# Patient Record
Sex: FEMALE | Race: OTHER | HISPANIC OR LATINO
[De-identification: names, ages, dates, MRNs, and addresses within clinical notes are randomized per-mention and may not be internally consistent; named-entity substitution may affect disease eponyms.]

---

## 2017-02-06 ENCOUNTER — APPOINTMENT (OUTPATIENT)
Dept: INTERNAL MEDICINE | Facility: CLINIC | Age: 72
End: 2017-02-06

## 2017-02-06 VITALS
BODY MASS INDEX: 26.93 KG/M2 | WEIGHT: 152 LBS | SYSTOLIC BLOOD PRESSURE: 140 MMHG | HEIGHT: 63 IN | OXYGEN SATURATION: 98 % | HEART RATE: 93 BPM | DIASTOLIC BLOOD PRESSURE: 80 MMHG

## 2017-02-06 DIAGNOSIS — M89.9 DISORDER OF BONE, UNSPECIFIED: ICD-10-CM

## 2017-02-08 VITALS — SYSTOLIC BLOOD PRESSURE: 130 MMHG | DIASTOLIC BLOOD PRESSURE: 80 MMHG

## 2017-02-10 LAB
25(OH)D3 SERPL-MCNC: 26.4 NG/ML
ALBUMIN SERPL ELPH-MCNC: 4.3 G/DL
ALP BLD-CCNC: 85 U/L
ALT SERPL-CCNC: 31 U/L
ANION GAP SERPL CALC-SCNC: 17 MMOL/L
AST SERPL-CCNC: 21 U/L
BILIRUB SERPL-MCNC: 0.5 MG/DL
BUN SERPL-MCNC: 16 MG/DL
CALCIUM SERPL-MCNC: 9.9 MG/DL
CHLORIDE SERPL-SCNC: 101 MMOL/L
CHOLEST SERPL-MCNC: 229 MG/DL
CHOLEST/HDLC SERPL: 2.6 RATIO
CO2 SERPL-SCNC: 23 MMOL/L
CREAT SERPL-MCNC: 0.92 MG/DL
GLUCOSE SERPL-MCNC: 103 MG/DL
HBA1C MFR BLD HPLC: 5.6 %
HDLC SERPL-MCNC: 88 MG/DL
LDLC SERPL CALC-MCNC: 120 MG/DL
POTASSIUM SERPL-SCNC: 4.5 MMOL/L
PROT SERPL-MCNC: 7.5 G/DL
SODIUM SERPL-SCNC: 141 MMOL/L
TRIGL SERPL-MCNC: 104 MG/DL
TSH SERPL-ACNC: 0.94 UIU/ML

## 2017-03-01 ENCOUNTER — FORM ENCOUNTER (OUTPATIENT)
Age: 72
End: 2017-03-01

## 2017-03-02 ENCOUNTER — APPOINTMENT (OUTPATIENT)
Dept: CT IMAGING | Facility: IMAGING CENTER | Age: 72
End: 2017-03-02

## 2017-03-02 ENCOUNTER — OUTPATIENT (OUTPATIENT)
Dept: OUTPATIENT SERVICES | Facility: HOSPITAL | Age: 72
LOS: 1 days | End: 2017-03-02
Payer: MEDICARE

## 2017-03-02 DIAGNOSIS — Q75.9 CONGENITAL MALFORMATION OF SKULL AND FACE BONES, UNSPECIFIED: ICD-10-CM

## 2017-03-02 DIAGNOSIS — M89.9 DISORDER OF BONE, UNSPECIFIED: ICD-10-CM

## 2017-03-02 PROCEDURE — 70450 CT HEAD/BRAIN W/O DYE: CPT

## 2017-03-28 ENCOUNTER — APPOINTMENT (OUTPATIENT)
Dept: ORTHOPEDIC SURGERY | Facility: CLINIC | Age: 72
End: 2017-03-28

## 2017-08-09 ENCOUNTER — APPOINTMENT (OUTPATIENT)
Dept: INTERNAL MEDICINE | Facility: CLINIC | Age: 72
End: 2017-08-09
Payer: MEDICARE

## 2017-08-09 VITALS
DIASTOLIC BLOOD PRESSURE: 84 MMHG | WEIGHT: 153 LBS | HEIGHT: 63 IN | SYSTOLIC BLOOD PRESSURE: 154 MMHG | BODY MASS INDEX: 27.11 KG/M2

## 2017-08-09 LAB
BILIRUB UR QL STRIP: NORMAL
CLARITY UR: CLEAR
COLLECTION METHOD: NORMAL
GLUCOSE UR-MCNC: NORMAL
HCG UR QL: 0.2 EU/DL
HGB UR QL STRIP.AUTO: NORMAL
KETONES UR-MCNC: NORMAL
LEUKOCYTE ESTERASE UR QL STRIP: NORMAL
NITRITE UR QL STRIP: NORMAL
PH UR STRIP: 5.5
PROT UR STRIP-MCNC: NORMAL
SP GR UR STRIP: 1.02

## 2017-08-09 PROCEDURE — 99213 OFFICE O/P EST LOW 20 MIN: CPT | Mod: 25

## 2017-08-09 PROCEDURE — 81003 URINALYSIS AUTO W/O SCOPE: CPT | Mod: QW

## 2017-08-10 ENCOUNTER — TRANSCRIPTION ENCOUNTER (OUTPATIENT)
Age: 72
End: 2017-08-10

## 2017-08-11 ENCOUNTER — TRANSCRIPTION ENCOUNTER (OUTPATIENT)
Age: 72
End: 2017-08-11

## 2017-08-14 ENCOUNTER — APPOINTMENT (OUTPATIENT)
Dept: ULTRASOUND IMAGING | Facility: CLINIC | Age: 72
End: 2017-08-14

## 2017-08-14 ENCOUNTER — TRANSCRIPTION ENCOUNTER (OUTPATIENT)
Age: 72
End: 2017-08-14

## 2017-08-14 LAB
APPEARANCE: ABNORMAL
BACTERIA UR CULT: NORMAL
BACTERIA: NEGATIVE
BILIRUBIN URINE: NEGATIVE
BLOOD URINE: ABNORMAL
COLOR: YELLOW
GLUCOSE QUALITATIVE U: NORMAL MG/DL
HYALINE CASTS: 2 /LPF
KETONES URINE: NEGATIVE
LEUKOCYTE ESTERASE URINE: ABNORMAL
MICROSCOPIC-UA: NORMAL
NITRITE URINE: NEGATIVE
PH URINE: 5.5
PROTEIN URINE: ABNORMAL MG/DL
RED BLOOD CELLS URINE: 157 /HPF
SPECIFIC GRAVITY URINE: 1.02
SQUAMOUS EPITHELIAL CELLS: 4 /HPF
UROBILINOGEN URINE: NORMAL MG/DL
WHITE BLOOD CELLS URINE: 4 /HPF

## 2017-08-30 ENCOUNTER — TRANSCRIPTION ENCOUNTER (OUTPATIENT)
Age: 72
End: 2017-08-30

## 2017-12-11 ENCOUNTER — TRANSCRIPTION ENCOUNTER (OUTPATIENT)
Age: 72
End: 2017-12-11

## 2017-12-13 ENCOUNTER — APPOINTMENT (OUTPATIENT)
Dept: ENDOCRINOLOGY | Facility: CLINIC | Age: 72
End: 2017-12-13
Payer: MEDICARE

## 2017-12-13 VITALS
BODY MASS INDEX: 3.19 KG/M2 | WEIGHT: 18 LBS | HEART RATE: 83 BPM | DIASTOLIC BLOOD PRESSURE: 72 MMHG | OXYGEN SATURATION: 98 % | HEIGHT: 63 IN | SYSTOLIC BLOOD PRESSURE: 124 MMHG

## 2017-12-13 PROCEDURE — 77080 DXA BONE DENSITY AXIAL: CPT | Mod: GA

## 2017-12-13 PROCEDURE — 99214 OFFICE O/P EST MOD 30 MIN: CPT | Mod: 25

## 2017-12-13 RX ORDER — MULTIVIT-MIN/FOLIC/VIT K/LYCOP 400-300MCG
25 MCG TABLET ORAL
Refills: 0 | Status: ACTIVE | COMMUNITY

## 2017-12-13 RX ORDER — DOXEPIN HYDROCHLORIDE 10 MG/1
10 CAPSULE ORAL
Qty: 30 | Refills: 0 | Status: DISCONTINUED | COMMUNITY
Start: 2017-03-20 | End: 2017-12-13

## 2018-02-12 ENCOUNTER — APPOINTMENT (OUTPATIENT)
Dept: INTERNAL MEDICINE | Facility: CLINIC | Age: 73
End: 2018-02-12
Payer: MEDICARE

## 2018-02-12 VITALS
DIASTOLIC BLOOD PRESSURE: 70 MMHG | BODY MASS INDEX: 27.46 KG/M2 | HEART RATE: 68 BPM | SYSTOLIC BLOOD PRESSURE: 130 MMHG | OXYGEN SATURATION: 97 % | WEIGHT: 155 LBS | HEIGHT: 63 IN

## 2018-02-12 DIAGNOSIS — R06.09 OTHER FORMS OF DYSPNEA: ICD-10-CM

## 2018-02-12 DIAGNOSIS — R31.9 HEMATURIA, UNSPECIFIED: ICD-10-CM

## 2018-02-12 PROCEDURE — 99215 OFFICE O/P EST HI 40 MIN: CPT

## 2018-02-21 LAB
25(OH)D3 SERPL-MCNC: 26.4 NG/ML
ALBUMIN SERPL ELPH-MCNC: 4.2 G/DL
ALP BLD-CCNC: 78 U/L
ALT SERPL-CCNC: 31 U/L
ANION GAP SERPL CALC-SCNC: 14 MMOL/L
AST SERPL-CCNC: 33 U/L
BASOPHILS # BLD AUTO: 0.02 K/UL
BASOPHILS NFR BLD AUTO: 0.4 %
BILIRUB SERPL-MCNC: 0.5 MG/DL
BUN SERPL-MCNC: 18 MG/DL
CALCIUM SERPL-MCNC: 9.9 MG/DL
CHLORIDE SERPL-SCNC: 101 MMOL/L
CHOLEST SERPL-MCNC: 238 MG/DL
CHOLEST/HDLC SERPL: 2.6 RATIO
CO2 SERPL-SCNC: 23 MMOL/L
CREAT SERPL-MCNC: 0.82 MG/DL
EOSINOPHIL # BLD AUTO: 0.05 K/UL
EOSINOPHIL NFR BLD AUTO: 1.1 %
GLUCOSE SERPL-MCNC: 103 MG/DL
HBA1C MFR BLD HPLC: 5.5 %
HCT VFR BLD CALC: 42.5 %
HDLC SERPL-MCNC: 92 MG/DL
HGB BLD-MCNC: 14.2 G/DL
IMM GRANULOCYTES NFR BLD AUTO: 0.4 %
LDLC SERPL CALC-MCNC: 126 MG/DL
LYMPHOCYTES # BLD AUTO: 1.02 K/UL
LYMPHOCYTES NFR BLD AUTO: 22 %
MAN DIFF?: NORMAL
MCHC RBC-ENTMCNC: 31.3 PG
MCHC RBC-ENTMCNC: 33.4 GM/DL
MCV RBC AUTO: 93.8 FL
MONOCYTES # BLD AUTO: 0.39 K/UL
MONOCYTES NFR BLD AUTO: 8.4 %
NEUTROPHILS # BLD AUTO: 3.13 K/UL
NEUTROPHILS NFR BLD AUTO: 67.7 %
PLATELET # BLD AUTO: 137 K/UL
POTASSIUM SERPL-SCNC: 5 MMOL/L
PROT SERPL-MCNC: 7.3 G/DL
RBC # BLD: 4.53 M/UL
RBC # FLD: 13.3 %
SODIUM SERPL-SCNC: 138 MMOL/L
TRIGL SERPL-MCNC: 100 MG/DL
TSH SERPL-ACNC: 0.64 UIU/ML
WBC # FLD AUTO: 4.63 K/UL

## 2018-03-29 ENCOUNTER — LABORATORY RESULT (OUTPATIENT)
Age: 73
End: 2018-03-29

## 2018-04-30 ENCOUNTER — APPOINTMENT (OUTPATIENT)
Dept: INTERNAL MEDICINE | Facility: CLINIC | Age: 73
End: 2018-04-30
Payer: MEDICARE

## 2018-04-30 VITALS
HEIGHT: 63 IN | HEART RATE: 82 BPM | DIASTOLIC BLOOD PRESSURE: 80 MMHG | BODY MASS INDEX: 27.82 KG/M2 | OXYGEN SATURATION: 97 % | SYSTOLIC BLOOD PRESSURE: 130 MMHG | WEIGHT: 157 LBS

## 2018-04-30 PROCEDURE — 99214 OFFICE O/P EST MOD 30 MIN: CPT

## 2018-06-14 ENCOUNTER — OUTPATIENT (OUTPATIENT)
Dept: OUTPATIENT SERVICES | Facility: HOSPITAL | Age: 73
LOS: 1 days | Discharge: HOME | End: 2018-06-14

## 2018-06-14 DIAGNOSIS — D69.6 THROMBOCYTOPENIA, UNSPECIFIED: ICD-10-CM

## 2018-06-15 ENCOUNTER — RESULT REVIEW (OUTPATIENT)
Age: 73
End: 2018-06-15

## 2018-06-15 ENCOUNTER — APPOINTMENT (OUTPATIENT)
Dept: HEMATOLOGY ONCOLOGY | Facility: CLINIC | Age: 73
End: 2018-06-15
Payer: MEDICARE

## 2018-06-15 DIAGNOSIS — Z83.2 FAMILY HISTORY OF DISEASES OF THE BLOOD AND BLOOD-FORMING ORGANS AND CERTAIN DISORDERS INVOLVING THE IMMUNE MECHANISM: ICD-10-CM

## 2018-06-15 LAB
BASOPHILS # BLD AUTO: 0 K/UL — SIGNIFICANT CHANGE UP (ref 0–0.2)
BASOPHILS NFR BLD AUTO: 0.4 % — SIGNIFICANT CHANGE UP (ref 0–2)
EOSINOPHIL # BLD AUTO: 0.2 K/UL — SIGNIFICANT CHANGE UP (ref 0–0.5)
EOSINOPHIL NFR BLD AUTO: 2 % — SIGNIFICANT CHANGE UP (ref 0–6)
HCT VFR BLD CALC: 40.6 % — SIGNIFICANT CHANGE UP (ref 34.5–45)
HGB BLD-MCNC: 14 G/DL — SIGNIFICANT CHANGE UP (ref 11.5–15.5)
LYMPHOCYTES # BLD AUTO: 1.7 K/UL — SIGNIFICANT CHANGE UP (ref 1–3.3)
LYMPHOCYTES # BLD AUTO: 18.6 % — SIGNIFICANT CHANGE UP (ref 13–44)
MCHC RBC-ENTMCNC: 31.3 PG — SIGNIFICANT CHANGE UP (ref 27–34)
MCHC RBC-ENTMCNC: 34.4 G/DL — SIGNIFICANT CHANGE UP (ref 32–36)
MCV RBC AUTO: 91.1 FL — SIGNIFICANT CHANGE UP (ref 80–100)
MONOCYTES # BLD AUTO: 0.6 K/UL — SIGNIFICANT CHANGE UP (ref 0–0.9)
MONOCYTES NFR BLD AUTO: 7 % — SIGNIFICANT CHANGE UP (ref 2–14)
NEUTROPHILS # BLD AUTO: 6.6 K/UL — SIGNIFICANT CHANGE UP (ref 1.8–7.4)
NEUTROPHILS NFR BLD AUTO: 72.1 % — SIGNIFICANT CHANGE UP (ref 43–77)
PLATELET # BLD AUTO: 177 K/UL — SIGNIFICANT CHANGE UP (ref 150–400)
RBC # BLD: 4.46 M/UL — SIGNIFICANT CHANGE UP (ref 3.8–5.2)
RBC # FLD: 11.5 % — SIGNIFICANT CHANGE UP (ref 10.3–14.5)
WBC # BLD: 9.2 K/UL — SIGNIFICANT CHANGE UP (ref 3.8–10.5)
WBC # FLD AUTO: 9.2 K/UL — SIGNIFICANT CHANGE UP (ref 3.8–10.5)

## 2018-06-15 PROCEDURE — 99202 OFFICE O/P NEW SF 15 MIN: CPT

## 2018-07-02 ENCOUNTER — APPOINTMENT (OUTPATIENT)
Dept: OBGYN | Facility: CLINIC | Age: 73
End: 2018-07-02
Payer: MEDICARE

## 2018-07-02 VITALS
DIASTOLIC BLOOD PRESSURE: 80 MMHG | BODY MASS INDEX: 27.29 KG/M2 | SYSTOLIC BLOOD PRESSURE: 138 MMHG | WEIGHT: 154 LBS | HEIGHT: 63 IN

## 2018-07-02 DIAGNOSIS — R10.9 UNSPECIFIED ABDOMINAL PAIN: ICD-10-CM

## 2018-07-02 PROCEDURE — G0101: CPT

## 2018-07-02 RX ORDER — KETOCONAZOLE 20.5 MG/ML
2 SHAMPOO, SUSPENSION TOPICAL
Qty: 120 | Refills: 0 | Status: COMPLETED | COMMUNITY
Start: 2017-03-28 | End: 2018-07-02

## 2018-07-04 LAB — HPV HIGH+LOW RISK DNA PNL CVX: NOT DETECTED

## 2018-07-06 LAB — CYTOLOGY CVX/VAG DOC THIN PREP: NORMAL

## 2018-12-20 PROBLEM — Z83.2 FAMILY HISTORY OF THROMBOCYTOPENIA: Status: ACTIVE | Noted: 2018-12-20

## 2018-12-20 RX ORDER — CARBOXYMETHYLCELLULOSE SODIUM 5 MG/ML
0.5 SOLUTION/ DROPS OPHTHALMIC
Refills: 0 | Status: ACTIVE | COMMUNITY
Start: 2018-12-20

## 2018-12-20 NOTE — CONSULT LETTER
[Dear  ___] : Dear ~PADMINI, [Courtesy Letter:] : I had the pleasure of seeing your patient, [unfilled], in my office today. [Please see my note below.] : Please see my note below. [Consult Closing:] : Thank you very much for allowing me to participate in the care of this patient.  If you have any questions, please do not hesitate to contact me. [Sincerely,] : Sincerely, [FreeTextEntry2] : Ingrid Lopez MD [FreeTextEntry3] : Avinash\par Cristobal Mccabe M.D., FACP\par Professor of Medicine\par [employer]\par Associate Chief, Division of Hematology\par [facility]\par [facility]\par [address]\[place] [address]\par [phone]\par \par \par \par

## 2018-12-20 NOTE — REVIEW OF SYSTEMS
[SOB on Exertion] : shortness of breath during exertion [Negative] : Allergic/Immunologic [Lower Ext Edema] : lower extremity edema [Abdominal Pain] : abdominal pain

## 2018-12-20 NOTE — HISTORY OF PRESENT ILLNESS
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 In February, 2018 her platelet count was noted to be 137,000. Repeat in March, 2018 was 135,000\par She feels well and is free of c/o. She notes minor bruising. She has occasional left upper quadrant fleeting sharp pains. No bleeding, HAs, visual problems, rash, arthritis. \par \par Her mother had myelodysplastic syndrome. Her son reportedly has thrombocytopenia..

## 2018-12-20 NOTE — ADDENDUM
[FreeTextEntry1] : Documented by Clint Sy acting as a scribe for Dr. Cristobal Mccabe on 12/20/18\par \par All medical record entries made by the Scribe were at my, Dr. Cristobal Mccabe's, direction and personally dictated by me on 12/20/18. I have reviewed the chart and agree that the record accurately reflects my personal performance of the history, physical exam, procedure and imaging

## 2018-12-20 NOTE — ASSESSMENT
[FreeTextEntry1] : 71 YO F with borderline thrombocytopenia several months ago. Her platelet count is now normal. Alcohol ingestion can lower platelet counts by direct suppression of the marrow.\par \par Plan:\par Reassure patient\par Monitor CBC with platelets. I will be pleased to review these results as needed. [Palliative Care Plan] : not applicable at this time

## 2019-02-05 ENCOUNTER — APPOINTMENT (OUTPATIENT)
Dept: ENDOCRINOLOGY | Facility: CLINIC | Age: 74
End: 2019-02-05
Payer: MEDICARE

## 2019-02-05 ENCOUNTER — APPOINTMENT (OUTPATIENT)
Dept: ENDOCRINOLOGY | Facility: CLINIC | Age: 74
End: 2019-02-05
Payer: COMMERCIAL

## 2019-02-05 VITALS
BODY MASS INDEX: 27.64 KG/M2 | SYSTOLIC BLOOD PRESSURE: 126 MMHG | HEIGHT: 63 IN | WEIGHT: 156 LBS | HEART RATE: 96 BPM | DIASTOLIC BLOOD PRESSURE: 80 MMHG | OXYGEN SATURATION: 98 %

## 2019-02-05 PROCEDURE — 77080 DXA BONE DENSITY AXIAL: CPT

## 2019-02-05 PROCEDURE — ZZZZZ: CPT

## 2019-02-05 PROCEDURE — 99214 OFFICE O/P EST MOD 30 MIN: CPT | Mod: 25

## 2019-02-05 RX ORDER — ACETAZOLAMIDE 125 MG/1
125 TABLET ORAL
Qty: 30 | Refills: 0 | Status: DISCONTINUED | COMMUNITY
Start: 2018-12-14 | End: 2019-02-05

## 2019-02-05 NOTE — PHYSICAL EXAM
[Alert] : alert [No Acute Distress] : no acute distress [Well Nourished] : well nourished [Well Developed] : well developed [Normal Sclera/Conjunctiva] : normal sclera/conjunctiva [No Proptosis] : no proptosis [Normal Oropharynx] : the oropharynx was normal [Thyroid Not Enlarged] : the thyroid was not enlarged [No Thyroid Nodules] : there were no palpable thyroid nodules [No Respiratory Distress] : no respiratory distress [No Accessory Muscle Use] : no accessory muscle use [Clear to Auscultation] : lungs were clear to auscultation bilaterally [Normal Rate] : heart rate was normal  [Normal S1, S2] : normal S1 and S2 [Regular Rhythm] : with a regular rhythm [Normal Bowel Sounds] : normal bowel sounds [Not Tender] : non-tender [Soft] : abdomen soft [Not Distended] : not distended [Post Cervical Nodes] : posterior cervical nodes [Anterior Cervical Nodes] : anterior cervical nodes [Axillary Nodes] : axillary nodes [Normal] : normal and non tender [No Spinal Tenderness] : no spinal tenderness [Spine Straight] : spine straight [No Stigmata of Cushings Syndrome] : no stigmata of cushings syndrome [Normal Gait] : normal gait [Normal Strength/Tone] : muscle strength and tone were normal [No Rash] : no rash [Normal Reflexes] : deep tendon reflexes were 2+ and symmetric [No Tremors] : no tremors [Oriented x3] : oriented to person, place, and time

## 2019-02-06 ENCOUNTER — RX RENEWAL (OUTPATIENT)
Age: 74
End: 2019-02-06

## 2019-02-06 NOTE — HISTORY OF PRESENT ILLNESS
[Risedronate (Actonel)] : Risedronate [Vitamin D (supplements)] : Vitamin D as a dietary supplement [Patient taking Meds Correctly] : Patient is taking meds correctly [FreeTextEntry1] : 1 year f/u 72 y/o female for osteoporosis. \par \par Pt previously on Actonel followed by drug holiday. She was then back on rx, off Actonel  since ~2013. Took Actonel for many years, tolerated well. BMD 12/2016 stable, sl increase in hip. BMD 2017 sl decrease in hip. Last DDS 3 mons. Prior dental implants. No ONJ. No interval fx. \par \par Had hematuria, dx with small stones saw urology Dr. Miranda.

## 2019-02-06 NOTE — PROCEDURE
[FreeTextEntry1] : Bone mineral density: 02/05/2019 \par Indication: vs. 2017 prior test showed bone loss \par Spine: -2.0 osteopenia, no significant change \par Total hip: -2.3 osteopenia (-4.3%)\par Femoral neck: -3.3 osteoporosis (-5.6%)\par Proximal radius: -3.8 osteoporosis (-6.8%)\par \par Bone mineral density December 13, 2017\par Indication prior test showed rapid bone loss\par Spine -2.0, osteopenia, no significant change\par Total hip -2.0, osteopenia, -4.9% versus 2016 although stable versus 2014\par Femoral neck -3.0, osteoporosis, -3.8%, not significant\par Proximal radius -3.3, osteoporosis, no significant change\par \par Bone mineral density December 13, 2016\par Two-year followup\par Spine -1.8, osteopenia, no significant change\par Total hip -1.7, osteopenia, +6.0%\par Femoral neck -2.8, osteoporosis, no significant change\par Troch -1.4, osteopenia, +4.3%\par Proximal radius -3.2, osteoporosis, no significant change\par \par bone mineral density Nov 26, 2014\par spine -1.8, osteopenia, stable vs 2012\par total hip, -2.1 osteopenia, stable\par femoral neck, -2.8, osteoporosis, stable\par proximal radius -3.1 ,osteoporosis,  -3.8 % vs 2012

## 2019-02-06 NOTE — END OF VISIT
[FreeTextEntry3] : I, Radha Garber, authored this note working as a medical scribe for Dr. Roman.  02/05/2019.  3:00PM. \par This note was authored by Radha Garber working as medical scribe for me. I have reviewed, edited, and revised the note as needed. I am in agreement with the exam findings, imaging findings, and treatment plan.  Braxton Roman MD

## 2019-02-06 NOTE — ASSESSMENT
[Bisphosphonate Therapy] : Risks  and benefits of bisphosphonate therapy were  discussed with the patient including gastroesophageal irritation, osteonecrosis of the jaw, and atypical femur fractures, and acute phase reaction [Bisphosphonates] : The patient was instructed to take bisphosphonates on an empty stomach with a full glass of water,and wait at least 30 minutes before eating or lying down [FreeTextEntry1] : 73 year-old female with osteoporosis. \par \par Pt is on a drug holiday after long term  Actonel started ~2013. BMD essentially stable; 2014 sl decrease in proximal radius, 2016 increased in tot hip, 2017 sl decrease in tot hip and fem neck. Option of restarting therapy or continuing drug holiday discussed. Elected to continue drug holiday. No interval fx. c/o arthritic pain. Repeat BMD 2/2019 indicates continued decrease in the tot hip, fem neck, and proximal radius. \par \par I recommend pt restart osteoporosis rx as she is at increased risk for future fx. Pt can restart Actonel as she previously took this and tolerated this. Risks and benefits discussed. All questions were answered. Pt understands and agrees. Prescription sent for Actonel. \par \par f/u in 1 year with repeat BMD. \par

## 2019-02-19 ENCOUNTER — APPOINTMENT (OUTPATIENT)
Dept: INTERNAL MEDICINE | Facility: CLINIC | Age: 74
End: 2019-02-19

## 2019-03-18 ENCOUNTER — TRANSCRIPTION ENCOUNTER (OUTPATIENT)
Age: 74
End: 2019-03-18

## 2019-03-19 ENCOUNTER — APPOINTMENT (OUTPATIENT)
Dept: INTERNAL MEDICINE | Facility: CLINIC | Age: 74
End: 2019-03-19
Payer: MEDICARE

## 2019-03-19 VITALS
OXYGEN SATURATION: 97 % | BODY MASS INDEX: 27.64 KG/M2 | TEMPERATURE: 98.5 F | WEIGHT: 156 LBS | SYSTOLIC BLOOD PRESSURE: 140 MMHG | DIASTOLIC BLOOD PRESSURE: 88 MMHG | HEART RATE: 96 BPM | HEIGHT: 63 IN

## 2019-03-19 DIAGNOSIS — M25.552 PAIN IN LEFT HIP: ICD-10-CM

## 2019-03-19 DIAGNOSIS — R09.81 NASAL CONGESTION: ICD-10-CM

## 2019-03-19 DIAGNOSIS — K58.9 IRRITABLE BOWEL SYNDROME W/OUT DIARRHEA: ICD-10-CM

## 2019-03-19 PROCEDURE — G0442 ANNUAL ALCOHOL SCREEN 15 MIN: CPT | Mod: 59

## 2019-03-19 PROCEDURE — G0439: CPT

## 2019-03-19 PROCEDURE — G0444 DEPRESSION SCREEN ANNUAL: CPT | Mod: 59

## 2019-03-19 NOTE — HISTORY OF PRESENT ILLNESS
[FreeTextEntry1] : cpe [de-identified] : 74 yo woman w OP, Breast ca. (DCIS ), allergic rhinitis/ chronic conjunctivitis , renal stones, depression/anxiety.\par Saw Endo Dr. KOHLI re OP, back on Actonel. BMD 2/19\par Gyn 7/18\par Heme re low plts, improved (etoh was suspected culprit)\par Mammo/US at [facility] [place] [place] 10/18\par [place] 9/17\par [place] regularly, going next mo\par \par c/o feeling down about her personal problems, family etc- didn't elaborate- not suicidal- does not like meds and does not like the idea or counseling or therapy. She would like to try "something light" medication-wise at this time. \par c/o allergic rhinitis activin up, taking Zyrtec-D, eye drops etc- feels she needs eye abx as she had discharge and itching, showed me last script from optho  Jordi-Poly-Dex.\par c/o left hip pain, chr, possible OA\par \par Still drinks 1-3 glasses of wine/d, not amenable to cutting down.\par Not exercising.\par doing a lot of traveling.

## 2019-03-19 NOTE — HEALTH RISK ASSESSMENT
[Good] : ~his/her~  mood as  good [No falls in past year] : Patient reported no falls in the past year [0] : 2) Feeling down, depressed, or hopeless: Not at all (0) [Patient reported mammogram was normal] : Patient reported mammogram was normal [Patient reported PAP Smear was normal] : Patient reported PAP Smear was normal [Patient reported colonoscopy was normal] : Patient reported colonoscopy was normal [Patient reported bone density results were abnormal] : Patient reported bone density results were abnormal [HIV test declined] : HIV test declined [Hepatitis C test declined] : Hepatitis C test declined [Alone] : lives alone [Retired] : retired [Single] : single [Feels Safe at Home] : Feels safe at home [Fully functional (bathing, dressing, toileting, transferring, walking, feeding)] : Fully functional (bathing, dressing, toileting, transferring, walking, feeding) [Fully functional (using the telephone, shopping, preparing meals, housekeeping, doing laundry, using] : Fully functional and needs no help or supervision to perform IADLs (using the telephone, shopping, preparing meals, housekeeping, doing laundry, using transportation, managing medications and managing finances) [Smoke Detector] : smoke detector [Carbon Monoxide Detector] : carbon monoxide detector [Safety elements used in home] : safety elements used in home [Seat Belt] :  uses seat belt [Designated Healthcare Proxy] : Designated healthcare proxy [None] : None [de-identified] : no [] : No [de-identified] : ok [Change in mental status noted] : No change in mental status noted [Language] : denies difficulty with language [# Of Children ___] : has [unfilled] children [Sexually Active] : not sexually active [Reports changes in hearing] : Reports no changes in hearing [Reports changes in vision] : Reports no changes in vision [Reports changes in dental health] : Reports no changes in dental health [Guns at Home] : no guns at home [Travel to Developing Areas] : does not  travel to developing areas [MammogramDate] : 10/18 [PapSmearDate] : 07/18 [BoneDensityDate] : 02/19 [ColonoscopyDate] : 09/17 [FreeTextEntry4] : will name one of her kids- son or  dgtr-\par not sure what she wants or which kid

## 2019-03-19 NOTE — COUNSELING
[Activity counseling provided] : activity [Fall prevention counseling provided] : fall prevention  [Good understanding] : Patient has a good understanding of lifestyle changes and the steps needed to achieve self management goals [ - Annual Alcohol Misuse Screening] : Annual Alcohol Misuse Screening [ - Annual Depression Screening] : Annual Depression Screening

## 2019-03-19 NOTE — ASSESSMENT
[FreeTextEntry1] : Pt w above conditions-\par we discussed cutting down etoh, which may predispose her to falls..\par BP sl elev, d/c meds w sudafed in them and monitor; decrease etoh too.\par Depression counseling, was amenable to a low dose of Sertraline and let me know how she's doing in 1-2 mos. not amenable to any counseling.\par send in Atrovent nasal spray\par check routine labs\par Ortho for hip\par Requests Vasc for VV\par Optho next mo; send in Poly shital dex\par \par

## 2019-03-19 NOTE — PHYSICAL EXAM
[No Acute Distress] : no acute distress [Well Nourished] : well nourished [Well Developed] : well developed [Well-Appearing] : well-appearing [PERRL] : pupils equal round and reactive to light [EOMI] : extraocular movements intact [Normal Outer Ear/Nose] : the outer ears and nose were normal in appearance [Normal Oropharynx] : the oropharynx was normal [No JVD] : no jugular venous distention [Supple] : supple [No Lymphadenopathy] : no lymphadenopathy [Thyroid Normal, No Nodules] : the thyroid was normal and there were no nodules present [No Respiratory Distress] : no respiratory distress  [Clear to Auscultation] : lungs were clear to auscultation bilaterally [No Accessory Muscle Use] : no accessory muscle use [Normal Rate] : normal rate  [Regular Rhythm] : with a regular rhythm [Normal S1, S2] : normal S1 and S2 [No Murmur] : no murmur heard [No Carotid Bruits] : no carotid bruits [No Abdominal Bruit] : a ~M bruit was not heard ~T in the abdomen [No Varicosities] : no varicosities [Pedal Pulses Present] : the pedal pulses are present [No Edema] : there was no peripheral edema [No Extremity Clubbing/Cyanosis] : no extremity clubbing/cyanosis [No Palpable Aorta] : no palpable aorta [Normal Appearance] : normal in appearance [No Axillary Lymphadenopathy] : no axillary lymphadenopathy [Soft] : abdomen soft [Non Tender] : non-tender [Non-distended] : non-distended [No Masses] : no abdominal mass palpated [No HSM] : no HSM [Normal Bowel Sounds] : normal bowel sounds [Normal Posterior Cervical Nodes] : no posterior cervical lymphadenopathy [Normal Anterior Cervical Nodes] : no anterior cervical lymphadenopathy [No CVA Tenderness] : no CVA  tenderness [No Spinal Tenderness] : no spinal tenderness [No Joint Swelling] : no joint swelling [Grossly Normal Strength/Tone] : grossly normal strength/tone [No Rash] : no rash [Normal Gait] : normal gait [Coordination Grossly Intact] : coordination grossly intact [No Focal Deficits] : no focal deficits [Deep Tendon Reflexes (DTR)] : deep tendon reflexes were 2+ and symmetric [Normal Affect] : the affect was normal [Normal Insight/Judgement] : insight and judgment were intact [de-identified] : bilat conjunctiva injected [de-identified] : VV

## 2019-03-26 LAB
ALBUMIN SERPL ELPH-MCNC: 4.4 G/DL
ALP BLD-CCNC: 87 U/L
ALT SERPL-CCNC: 28 U/L
ANION GAP SERPL CALC-SCNC: 12 MMOL/L
AST SERPL-CCNC: 26 U/L
BASOPHILS # BLD AUTO: 0.03 K/UL
BASOPHILS NFR BLD AUTO: 0.4 %
BILIRUB SERPL-MCNC: 0.4 MG/DL
BUN SERPL-MCNC: 13 MG/DL
CALCIUM SERPL-MCNC: 9.9 MG/DL
CHLORIDE SERPL-SCNC: 100 MMOL/L
CHOLEST SERPL-MCNC: 214 MG/DL
CHOLEST/HDLC SERPL: 2.4 RATIO
CO2 SERPL-SCNC: 26 MMOL/L
CREAT SERPL-MCNC: 0.8 MG/DL
EOSINOPHIL # BLD AUTO: 0.15 K/UL
EOSINOPHIL NFR BLD AUTO: 2.1 %
GLUCOSE SERPL-MCNC: 105 MG/DL
HBA1C MFR BLD HPLC: 5.2 %
HCT VFR BLD CALC: 45.1 %
HDLC SERPL-MCNC: 90 MG/DL
HGB BLD-MCNC: 14.5 G/DL
IMM GRANULOCYTES NFR BLD AUTO: 0.8 %
LDLC SERPL CALC-MCNC: 105 MG/DL
LYMPHOCYTES # BLD AUTO: 0.86 K/UL
LYMPHOCYTES NFR BLD AUTO: 12.1 %
MAN DIFF?: NORMAL
MCHC RBC-ENTMCNC: 30.5 PG
MCHC RBC-ENTMCNC: 32.2 GM/DL
MCV RBC AUTO: 94.9 FL
MONOCYTES # BLD AUTO: 0.77 K/UL
MONOCYTES NFR BLD AUTO: 10.9 %
NEUTROPHILS # BLD AUTO: 5.21 K/UL
NEUTROPHILS NFR BLD AUTO: 73.7 %
PLATELET # BLD AUTO: 156 K/UL
POTASSIUM SERPL-SCNC: 4.8 MMOL/L
PROT SERPL-MCNC: 6.9 G/DL
RBC # BLD: 4.75 M/UL
RBC # FLD: 12.8 %
SODIUM SERPL-SCNC: 138 MMOL/L
TRIGL SERPL-MCNC: 96 MG/DL
TSH SERPL-ACNC: 0.85 UIU/ML
WBC # FLD AUTO: 7.08 K/UL

## 2020-02-14 ENCOUNTER — APPOINTMENT (OUTPATIENT)
Dept: ENDOCRINOLOGY | Facility: CLINIC | Age: 75
End: 2020-02-14
Payer: MEDICARE

## 2020-02-14 VITALS
WEIGHT: 154 LBS | OXYGEN SATURATION: 98 % | HEIGHT: 62.9 IN | HEART RATE: 85 BPM | BODY MASS INDEX: 27.29 KG/M2 | DIASTOLIC BLOOD PRESSURE: 68 MMHG | SYSTOLIC BLOOD PRESSURE: 110 MMHG

## 2020-02-14 PROCEDURE — 99213 OFFICE O/P EST LOW 20 MIN: CPT | Mod: 25

## 2020-02-14 PROCEDURE — ZZZZZ: CPT

## 2020-02-14 PROCEDURE — 77080 DXA BONE DENSITY AXIAL: CPT | Mod: GA

## 2020-02-14 RX ORDER — IPRATROPIUM BROMIDE AND ALBUTEROL 20; 100 UG/1; UG/1
20-100 SPRAY, METERED RESPIRATORY (INHALATION) 4 TIMES DAILY
Qty: 1 | Refills: 3 | Status: DISCONTINUED | COMMUNITY
Start: 2018-02-12 | End: 2020-02-14

## 2020-02-14 RX ORDER — NEOMYCIN SULFATE, POLYMYXIN B SULFATE AND DEXAMETHASONE 3.5; 10000; 1 MG/ML; [USP'U]/ML; MG/ML
3.5-10000-0.1 SUSPENSION OPHTHALMIC 4 TIMES DAILY
Qty: 1 | Refills: 5 | Status: DISCONTINUED | COMMUNITY
Start: 2019-03-19 | End: 2020-02-14

## 2020-02-14 RX ORDER — SERTRALINE 25 MG/1
25 TABLET, FILM COATED ORAL DAILY
Qty: 90 | Refills: 3 | Status: DISCONTINUED | COMMUNITY
Start: 2019-03-19 | End: 2020-02-14

## 2020-02-14 RX ORDER — DOCUSATE SODIUM 100 MG/1
100 CAPSULE ORAL
Refills: 0 | Status: DISCONTINUED | COMMUNITY
Start: 2018-12-20 | End: 2020-02-14

## 2020-02-14 RX ORDER — AZITHROMYCIN 250 MG/1
250 TABLET, FILM COATED ORAL
Qty: 1 | Refills: 0 | Status: DISCONTINUED | COMMUNITY
Start: 2019-06-21 | End: 2020-02-14

## 2020-02-14 RX ORDER — IPRATROPIUM BROMIDE 42 UG/1
0.06 SPRAY NASAL 3 TIMES DAILY
Qty: 1 | Refills: 1 | Status: DISCONTINUED | COMMUNITY
Start: 2019-03-19 | End: 2020-02-14

## 2020-02-14 NOTE — HISTORY OF PRESENT ILLNESS
[Vitamin D (supplements)] : Vitamin D as a dietary supplement [Risedronate (Actonel)] : Risedronate [Patient taking Meds Correctly] : Patient is taking meds correctly [FreeTextEntry1] : f/u 75 y/o female for osteoporosis. \par \par Pt previously on Actonel followed by drug holiday. She was then back on rx, off Actonel  since ~2013. Took Actonel for many years, tolerated well. BMD 12/2016 stable, sl increase in hip. BMD 2017 sl decrease in hip. Last DDS 3 mons. Prior dental implants. No ONJ. No interval fx. BMD 2/2019 indicates continued decrease in the tot hip, fem neck, and proximal radius. Reviewed options of therapy. Pt restarted Actonel 2/2019. Taking correctly, tolerating well. No interval fx, no UGI sx, no thigh pain. No aches & pains. No heartburn. Last DDS within past 6 months. No ONJ.\par \par Had hematuria, dx with small stones saw urology Dr. Miranda.

## 2020-02-14 NOTE — END OF VISIT
[FreeTextEntry3] : I, Eliot Lima, authored this note working as a medical scribe for Dr. Roman.  02/14/2020.  1:00PM. This note was authored by the medical scribe for me. I have reviewed, edited, and revised the note as needed. I am in agreement with the exam findings, imaging findings, and treatment plan.  Braxton Roman MD

## 2020-02-14 NOTE — PHYSICAL EXAM
[No Acute Distress] : no acute distress [Alert] : alert [Well Nourished] : well nourished [Well Developed] : well developed [Normal Sclera/Conjunctiva] : normal sclera/conjunctiva [No Proptosis] : no proptosis [Thyroid Not Enlarged] : the thyroid was not enlarged [Normal Oropharynx] : the oropharynx was normal [No Thyroid Nodules] : there were no palpable thyroid nodules [No Respiratory Distress] : no respiratory distress [No Accessory Muscle Use] : no accessory muscle use [Clear to Auscultation] : lungs were clear to auscultation bilaterally [Normal Rate] : heart rate was normal  [Normal S1, S2] : normal S1 and S2 [Regular Rhythm] : with a regular rhythm [Normal Bowel Sounds] : normal bowel sounds [Not Tender] : non-tender [Soft] : abdomen soft [Not Distended] : not distended [Post Cervical Nodes] : posterior cervical nodes [Anterior Cervical Nodes] : anterior cervical nodes [Axillary Nodes] : axillary nodes [Normal] : normal and non tender [No Spinal Tenderness] : no spinal tenderness [Spine Straight] : spine straight [No Stigmata of Cushings Syndrome] : no stigmata of cushings syndrome [Normal Gait] : normal gait [Normal Strength/Tone] : muscle strength and tone were normal [No Rash] : no rash [Normal Reflexes] : deep tendon reflexes were 2+ and symmetric [No Tremors] : no tremors [Oriented x3] : oriented to person, place, and time

## 2020-02-14 NOTE — PROCEDURE
[FreeTextEntry1] : Bone mineral density: 02/14/2020 \par Indication: vs. 2019 assess response to medication\par Spine: -1.8 osteopenia, +3.4%\par Total hip: -2.1 osteopenia, +4.0%\par Femoral neck: -2.8 osteoporosis, +11.3%\par Proximal radius: -3.1 osteoporosis +9.9%\par \par Bone mineral density: 02/05/2019 \par Indication: vs. 2017 prior test showed bone loss \par Spine: -2.0 osteopenia, no significant change \par Total hip: -2.3 osteopenia (-4.3%)\par Femoral neck: -3.3 osteoporosis (-5.6%)\par Proximal radius: -3.8 osteoporosis (-6.8%)\par \par Bone mineral density December 13, 2017\par Indication prior test showed rapid bone loss\par Spine -2.0, osteopenia, no significant change\par Total hip -2.0, osteopenia, -4.9% versus 2016 although stable versus 2014\par Femoral neck -3.0, osteoporosis, -3.8%, not significant\par Proximal radius -3.3, osteoporosis, no significant change\par \par Bone mineral density December 13, 2016\par Two-year followup\par Spine -1.8, osteopenia, no significant change\par Total hip -1.7, osteopenia, +6.0%\par Femoral neck -2.8, osteoporosis, no significant change\par Troch -1.4, osteopenia, +4.3%\par Proximal radius -3.2, osteoporosis, no significant change\par \par bone mineral density Nov 26, 2014\par spine -1.8, osteopenia, stable vs 2012\par total hip, -2.1 osteopenia, stable\par femoral neck, -2.8, osteoporosis, stable\par proximal radius -3.1 ,osteoporosis,  -3.8 % vs 2012

## 2020-02-14 NOTE — ASSESSMENT
[Bisphosphonate Therapy] : Risks  and benefits of bisphosphonate therapy were  discussed with the patient including gastroesophageal irritation, osteonecrosis of the jaw, and atypical femur fractures, and acute phase reaction [Bisphosphonates] : The patient was instructed to take bisphosphonates on an empty stomach with a full glass of water,and wait at least 30 minutes before eating or lying down [FreeTextEntry1] : 74 year-old female with osteoporosis. \par \par Pt was on Actonel until ~2013. She then started drug holiday. BMD essentially stable; 2014 sl decrease in proximal radius, 2016 increased in tot hip, 2017 sl decrease in tot hip and fem neck. No interval fx. BMD 2/2019 indicates continued decrease in the tot hip, fem neck, and proximal radius. Reviewed options of therapy. Pt had elected to restart Actonel 2/2019. Taking correctly, tolerating well. No interval fx, no UGI sx, no thigh pain. No aches & pains. No heartburn. No ONJ. BMD 2/2020 indicates improving osteopenia in spine and total hip, and improving osteoporosis in femoral neck and proximal radius.\par \par Labs reviewed: Ca 10.1, normal. Vitamin D 28, low, initially 16. No evidence of celiac disease, celiac antibodies negative.\par Vitamin D insufficiency.  recommend  vitamin D 2,000 untis per day. . \par \par f/u in 1 year

## 2020-06-19 ENCOUNTER — APPOINTMENT (OUTPATIENT)
Dept: INTERNAL MEDICINE | Facility: CLINIC | Age: 75
End: 2020-06-19
Payer: MEDICARE

## 2020-06-19 VITALS
HEART RATE: 86 BPM | HEIGHT: 62 IN | BODY MASS INDEX: 28.34 KG/M2 | TEMPERATURE: 97.5 F | OXYGEN SATURATION: 98 % | RESPIRATION RATE: 14 BRPM | SYSTOLIC BLOOD PRESSURE: 130 MMHG | DIASTOLIC BLOOD PRESSURE: 80 MMHG | WEIGHT: 154 LBS

## 2020-06-19 VITALS
BODY MASS INDEX: 27.29 KG/M2 | OXYGEN SATURATION: 98 % | HEART RATE: 86 BPM | HEIGHT: 62.9 IN | SYSTOLIC BLOOD PRESSURE: 137 MMHG | DIASTOLIC BLOOD PRESSURE: 82 MMHG | WEIGHT: 154 LBS | TEMPERATURE: 97.5 F

## 2020-06-19 DIAGNOSIS — R21 RASH AND OTHER NONSPECIFIC SKIN ERUPTION: ICD-10-CM

## 2020-06-19 PROCEDURE — 99214 OFFICE O/P EST MOD 30 MIN: CPT

## 2020-06-19 NOTE — ASSESSMENT
[FreeTextEntry1] : Pytariasis rosea.\par Would contin w 2 lotions for sx relief, will also send Atarax for night, is also on Zyrtec for chr allergies.\par Check labs incl Covid and syph per routine for rash.\par Xray skull.\par Derm when able.\par Can resume Sertraline if felt helpful, ok to hold off if ok.

## 2020-06-19 NOTE — PHYSICAL EXAM
[No Acute Distress] : no acute distress [Well Nourished] : well nourished [Well Developed] : well developed [Well-Appearing] : well-appearing [Normal Sclera/Conjunctiva] : normal sclera/conjunctiva [PERRL] : pupils equal round and reactive to light [EOMI] : extraocular movements intact [Normal Outer Ear/Nose] : the outer ears and nose were normal in appearance [Normal Oropharynx] : the oropharynx was normal [No JVD] : no jugular venous distention [No Lymphadenopathy] : no lymphadenopathy [Supple] : supple [Thyroid Normal, No Nodules] : the thyroid was normal and there were no nodules present [No Respiratory Distress] : no respiratory distress  [No Accessory Muscle Use] : no accessory muscle use [Clear to Auscultation] : lungs were clear to auscultation bilaterally [Normal Rate] : normal rate  [Regular Rhythm] : with a regular rhythm [Normal S1, S2] : normal S1 and S2 [No Murmur] : no murmur heard [No Carotid Bruits] : no carotid bruits [No Abdominal Bruit] : a ~M bruit was not heard ~T in the abdomen [Pedal Pulses Present] : the pedal pulses are present [No Varicosities] : no varicosities [No Edema] : there was no peripheral edema [No Palpable Aorta] : no palpable aorta [No Extremity Clubbing/Cyanosis] : no extremity clubbing/cyanosis [Soft] : abdomen soft [Non Tender] : non-tender [Non-distended] : non-distended [No Masses] : no abdominal mass palpated [No HSM] : no HSM [Normal Bowel Sounds] : normal bowel sounds [Normal Anterior Cervical Nodes] : no anterior cervical lymphadenopathy [No CVA Tenderness] : no CVA  tenderness [Normal Posterior Cervical Nodes] : no posterior cervical lymphadenopathy [No Spinal Tenderness] : no spinal tenderness [No Joint Swelling] : no joint swelling [Grossly Normal Strength/Tone] : grossly normal strength/tone [No Rash] : no rash [Coordination Grossly Intact] : coordination grossly intact [No Focal Deficits] : no focal deficits [Deep Tendon Reflexes (DTR)] : deep tendon reflexes were 2+ and symmetric [Normal Gait] : normal gait [Normal Affect] : the affect was normal [Normal Insight/Judgement] : insight and judgment were intact [de-identified] : right forehead w bony mass no change [de-identified] : salmon colored patches diffusely, w herald patch on back; freckle on lip

## 2020-06-19 NOTE — HISTORY OF PRESENT ILLNESS
[FreeTextEntry8] : Rash since 5/27/20\par Rash broke out, she had televisits with Derm Dr. Gale-?dx but  initially had temp to 99 x 1 night. Occas frontal HA ?congested (chr)vs other. No expos to Covid. \par Had been gardening. Pruritic.\par was given HC 2.5% crm/lotion and Halobetasol cm/lotion . No improvemt.\par She then did a Pred taper which helped somewhat,  but felt unwell and was worried her BP might be up after reading side effects...called Derm who said  to d/c, also to d/c Sertraline. \par She cold-turkeyed off Sert without a problem, not sure if was helping or not.\par \par Additionally: freckle on lip\par Bony mass on right forehead entire life, feels it might be getting larger.

## 2020-06-23 LAB
25(OH)D3 SERPL-MCNC: 34.2 NG/ML
ALBUMIN SERPL ELPH-MCNC: 4.7 G/DL
ALP BLD-CCNC: 77 U/L
ALT SERPL-CCNC: 21 U/L
ANION GAP SERPL CALC-SCNC: 17 MMOL/L
AST SERPL-CCNC: 24 U/L
BASOPHILS # BLD AUTO: 0.04 K/UL
BASOPHILS NFR BLD AUTO: 0.8 %
BILIRUB SERPL-MCNC: 0.3 MG/DL
BUN SERPL-MCNC: 17 MG/DL
CALCIUM SERPL-MCNC: 9.8 MG/DL
CHLORIDE SERPL-SCNC: 105 MMOL/L
CHOLEST SERPL-MCNC: 233 MG/DL
CHOLEST/HDLC SERPL: 2.8 RATIO
CO2 SERPL-SCNC: 20 MMOL/L
CREAT SERPL-MCNC: 0.89 MG/DL
EOSINOPHIL # BLD AUTO: 0.13 K/UL
EOSINOPHIL NFR BLD AUTO: 2.5 %
ESTIMATED AVERAGE GLUCOSE: 105 MG/DL
GLUCOSE SERPL-MCNC: 92 MG/DL
HBA1C MFR BLD HPLC: 5.3 %
HCT VFR BLD CALC: 43.5 %
HDLC SERPL-MCNC: 85 MG/DL
HGB BLD-MCNC: 13.4 G/DL
IMM GRANULOCYTES NFR BLD AUTO: 1.5 %
LDLC SERPL CALC-MCNC: 126 MG/DL
LYMPHOCYTES # BLD AUTO: 0.98 K/UL
LYMPHOCYTES NFR BLD AUTO: 18.7 %
MAN DIFF?: NORMAL
MCHC RBC-ENTMCNC: 30.5 PG
MCHC RBC-ENTMCNC: 30.8 GM/DL
MCV RBC AUTO: 98.9 FL
MONOCYTES # BLD AUTO: 0.5 K/UL
MONOCYTES NFR BLD AUTO: 9.5 %
NEUTROPHILS # BLD AUTO: 3.52 K/UL
NEUTROPHILS NFR BLD AUTO: 67 %
PLATELET # BLD AUTO: 169 K/UL
POTASSIUM SERPL-SCNC: 4.7 MMOL/L
PROT SERPL-MCNC: 6.6 G/DL
RBC # BLD: 4.4 M/UL
RBC # FLD: 13.7 %
SARS-COV-2 IGG SERPL IA-ACNC: <0.1 INDEX
SARS-COV-2 IGG SERPL QL IA: NEGATIVE
SODIUM SERPL-SCNC: 141 MMOL/L
T PALLIDUM AB SER QL IA: NEGATIVE
TRIGL SERPL-MCNC: 111 MG/DL
TSH SERPL-ACNC: 1.1 UIU/ML
WBC # FLD AUTO: 5.25 K/UL

## 2020-08-25 ENCOUNTER — APPOINTMENT (OUTPATIENT)
Dept: INTERNAL MEDICINE | Facility: CLINIC | Age: 75
End: 2020-08-25
Payer: MEDICARE

## 2020-08-25 VITALS
HEIGHT: 62 IN | TEMPERATURE: 98.2 F | SYSTOLIC BLOOD PRESSURE: 163 MMHG | HEART RATE: 87 BPM | BODY MASS INDEX: 28.34 KG/M2 | WEIGHT: 154 LBS | DIASTOLIC BLOOD PRESSURE: 92 MMHG | OXYGEN SATURATION: 97 %

## 2020-08-25 DIAGNOSIS — R23.8 OTHER SKIN CHANGES: ICD-10-CM

## 2020-08-25 PROCEDURE — 99213 OFFICE O/P EST LOW 20 MIN: CPT

## 2020-08-25 RX ORDER — ZOSTER VACCINE RECOMBINANT, ADJUVANTED 50 MCG/0.5
50 KIT INTRAMUSCULAR
Qty: 1 | Refills: 0 | Status: DISCONTINUED | COMMUNITY
Start: 2019-03-19 | End: 2020-08-25

## 2020-08-25 RX ORDER — CETIRIZINE HCL 10 MG
TABLET ORAL
Refills: 0 | Status: DISCONTINUED | COMMUNITY
End: 2020-08-25

## 2020-08-25 RX ORDER — SERTRALINE HYDROCHLORIDE 50 MG/1
50 TABLET, FILM COATED ORAL
Qty: 90 | Refills: 3 | Status: DISCONTINUED | COMMUNITY
Start: 2019-06-21 | End: 2020-08-25

## 2020-08-25 NOTE — HISTORY OF PRESENT ILLNESS
[FreeTextEntry8] : large bruise right arm and right eye 2 wks ago\par has photos-right upper arm severe wrap=around bruise as if a cuff was on there;\par not aware of any trauma; does tend to rub eyes due to allergies\par used  2 Advil qhs for a few nites begfore this, for HA, which is gone.\par h/o low plts\par Mother had Myelodyspl sx\par \par no fev/er/chills no diarrhea no s/s Covid\par

## 2020-08-25 NOTE — ASSESSMENT
[FreeTextEntry1] : Excessive bruising, no other sxs.\par Suspect due to low plts and/or use of Advil.\par Check approp labs, has seen Dr. Eliot Tucker in past, consider referral.\par Tylenol if needed for pain until picutre more clear.

## 2020-08-25 NOTE — PHYSICAL EXAM
[No Acute Distress] : no acute distress [Well Nourished] : well nourished [Well Developed] : well developed [Well-Appearing] : well-appearing [Normal Sclera/Conjunctiva] : normal sclera/conjunctiva [EOMI] : extraocular movements intact [PERRL] : pupils equal round and reactive to light [Normal Outer Ear/Nose] : the outer ears and nose were normal in appearance [Normal Oropharynx] : the oropharynx was normal [No JVD] : no jugular venous distention [No Lymphadenopathy] : no lymphadenopathy [Thyroid Normal, No Nodules] : the thyroid was normal and there were no nodules present [Supple] : supple [No Accessory Muscle Use] : no accessory muscle use [No Respiratory Distress] : no respiratory distress  [Clear to Auscultation] : lungs were clear to auscultation bilaterally [Normal Rate] : normal rate  [Normal S1, S2] : normal S1 and S2 [Regular Rhythm] : with a regular rhythm [No Murmur] : no murmur heard [No Carotid Bruits] : no carotid bruits [No Abdominal Bruit] : a ~M bruit was not heard ~T in the abdomen [No Varicosities] : no varicosities [No Edema] : there was no peripheral edema [Pedal Pulses Present] : the pedal pulses are present [No Palpable Aorta] : no palpable aorta [No Extremity Clubbing/Cyanosis] : no extremity clubbing/cyanosis [Non Tender] : non-tender [Soft] : abdomen soft [Non-distended] : non-distended [No Masses] : no abdominal mass palpated [No HSM] : no HSM [Normal Anterior Cervical Nodes] : no anterior cervical lymphadenopathy [Normal Bowel Sounds] : normal bowel sounds [Normal Posterior Cervical Nodes] : no posterior cervical lymphadenopathy [No CVA Tenderness] : no CVA  tenderness [No Spinal Tenderness] : no spinal tenderness [No Joint Swelling] : no joint swelling [Grossly Normal Strength/Tone] : grossly normal strength/tone [No Rash] : no rash [No Focal Deficits] : no focal deficits [Coordination Grossly Intact] : coordination grossly intact [Normal Gait] : normal gait [Normal Affect] : the affect was normal [Normal Insight/Judgement] : insight and judgment were intact [de-identified] : resolving ecchymosis right upper arm; right inner eyelid

## 2020-08-26 LAB
ALBUMIN SERPL ELPH-MCNC: 4.4 G/DL
ALP BLD-CCNC: 71 U/L
ALT SERPL-CCNC: 29 U/L
ANION GAP SERPL CALC-SCNC: 14 MMOL/L
APTT BLD: 33.6 SEC
AST SERPL-CCNC: 27 U/L
BASOPHILS # BLD AUTO: 0.03 K/UL
BASOPHILS NFR BLD AUTO: 0.6 %
BILIRUB SERPL-MCNC: 0.4 MG/DL
BUN SERPL-MCNC: 16 MG/DL
CALCIUM SERPL-MCNC: 9.4 MG/DL
CHLORIDE SERPL-SCNC: 105 MMOL/L
CO2 SERPL-SCNC: 22 MMOL/L
CREAT SERPL-MCNC: 0.82 MG/DL
EOSINOPHIL # BLD AUTO: 0.1 K/UL
EOSINOPHIL NFR BLD AUTO: 2.1 %
GLUCOSE SERPL-MCNC: 95 MG/DL
HCT VFR BLD CALC: 44.9 %
HGB BLD-MCNC: 13.7 G/DL
IMM GRANULOCYTES NFR BLD AUTO: 1 %
INR PPP: 0.96 RATIO
LYMPHOCYTES # BLD AUTO: 1.01 K/UL
LYMPHOCYTES NFR BLD AUTO: 20.8 %
MAN DIFF?: NORMAL
MCHC RBC-ENTMCNC: 30.4 PG
MCHC RBC-ENTMCNC: 30.5 GM/DL
MCV RBC AUTO: 99.8 FL
MONOCYTES # BLD AUTO: 0.42 K/UL
MONOCYTES NFR BLD AUTO: 8.6 %
NEUTROPHILS # BLD AUTO: 3.25 K/UL
NEUTROPHILS NFR BLD AUTO: 66.9 %
PLATELET # BLD AUTO: 156 K/UL
POTASSIUM SERPL-SCNC: 4.2 MMOL/L
PROT SERPL-MCNC: 6.4 G/DL
PT BLD: 11.4 SEC
RBC # BLD: 4.5 M/UL
RBC # FLD: 13.8 %
SARS-COV-2 IGG SERPL IA-ACNC: <0.1 INDEX
SARS-COV-2 IGG SERPL QL IA: NEGATIVE
SODIUM SERPL-SCNC: 140 MMOL/L
WBC # FLD AUTO: 4.86 K/UL

## 2020-09-14 ENCOUNTER — APPOINTMENT (OUTPATIENT)
Dept: INTERNAL MEDICINE | Facility: CLINIC | Age: 75
End: 2020-09-14
Payer: MEDICARE

## 2020-09-14 VITALS
TEMPERATURE: 96.6 F | OXYGEN SATURATION: 97 % | DIASTOLIC BLOOD PRESSURE: 82 MMHG | BODY MASS INDEX: 29.08 KG/M2 | HEART RATE: 85 BPM | WEIGHT: 158 LBS | SYSTOLIC BLOOD PRESSURE: 166 MMHG | HEIGHT: 62 IN

## 2020-09-14 VITALS — DIASTOLIC BLOOD PRESSURE: 80 MMHG | SYSTOLIC BLOOD PRESSURE: 150 MMHG

## 2020-09-14 DIAGNOSIS — R03.0 ELEVATED BLOOD-PRESSURE READING, W/OUT DIAGNOSIS OF HYPERTENSION: ICD-10-CM

## 2020-09-14 DIAGNOSIS — K21.9 GASTRO-ESOPHAGEAL REFLUX DISEASE W/OUT ESOPHAGITIS: ICD-10-CM

## 2020-09-14 PROCEDURE — 90662 IIV NO PRSV INCREASED AG IM: CPT

## 2020-09-14 PROCEDURE — G0442 ANNUAL ALCOHOL SCREEN 15 MIN: CPT | Mod: 59

## 2020-09-14 PROCEDURE — G0439: CPT

## 2020-09-14 PROCEDURE — G0008: CPT

## 2020-09-14 PROCEDURE — G0444 DEPRESSION SCREEN ANNUAL: CPT | Mod: 59

## 2020-09-14 NOTE — ASSESSMENT
[FreeTextEntry1] : Pt as outlined above.\par BP elevated, she believes white coat component- we discussed salt avoidance, etoh reduction, possible need for medicaiton. She will monitor at home.\par HLD- needs a statin- declines for now-will work on diet/exercise; I advised seeing Lipidologist\par Will get carotid dopplers, last were in '11 after transient amnesia\par Gerd- will try course of Nexium-if not improving needs to see GI since this is relatively new/worse.\par Vasc- for VV\par optho new names\par Mammo/US next mo at [facility]\par Flu shot given.\par f/u 3 mos labs

## 2020-09-14 NOTE — HEALTH RISK ASSESSMENT
[Good] : ~his/her~  mood as  good [Yes] : Yes [3 or 4 (1 pt)] : 3 or 4  (1 point) [No falls in past year] : Patient reported no falls in the past year [0] : 2) Feeling down, depressed, or hopeless: Not at all (0) [Patient reported mammogram was normal] : Patient reported mammogram was normal [Patient reported bone density results were abnormal] : Patient reported bone density results were abnormal [Patient reported PAP Smear was normal] : Patient reported PAP Smear was normal [HIV test declined] : HIV test declined [Hepatitis C test declined] : Hepatitis C test declined [Patient reported colonoscopy was normal] : Patient reported colonoscopy was normal [Alone] : lives alone [Retired] : retired [None] : None [Single] : single [Feels Safe at Home] : Feels safe at home [Fully functional (bathing, dressing, toileting, transferring, walking, feeding)] : Fully functional (bathing, dressing, toileting, transferring, walking, feeding) [Fully functional (using the telephone, shopping, preparing meals, housekeeping, doing laundry, using] : Fully functional and needs no help or supervision to perform IADLs (using the telephone, shopping, preparing meals, housekeeping, doing laundry, using transportation, managing medications and managing finances) [Smoke Detector] : smoke detector [Safety elements used in home] : safety elements used in home [Carbon Monoxide Detector] : carbon monoxide detector [Sunscreen] : uses sunscreen [Seat Belt] :  uses seat belt [Patient/Caregiver not ready to engage] : Patient/Caregiver not ready to engage [] : No [de-identified] : no [de-identified] : healthy [Change in mental status noted] : No change in mental status noted [Language] : denies difficulty with language [Sexually Active] : not sexually active [Reports changes in hearing] : Reports no changes in hearing [Reports changes in vision] : Reports no changes in vision [Reports changes in dental health] : Reports no changes in dental health [Guns at Home] : no guns at home [Travel to Developing Areas] : does not  travel to developing areas [TB Exposure] : is not being exposed to tuberculosis [MammogramDate] : 10/19 [PapSmearDate] : 10/18 [BoneDensityDate] : 02/20 [ColonoscopyDate] : 10/17

## 2020-09-14 NOTE — HISTORY OF PRESENT ILLNESS
[FreeTextEntry1] : cpe [de-identified] : 75 yo woman w OP, Breast ca. (DCIS ),  HLD, allergic rhinitis/ chronic conjunctivitis , renal stones,  Gerd, IBS, depression/anxiety.\par Seeing  Endo  W re OP,  on Actonel monthly.\par Gyn 7/18\par Heme re low plts, improved (etoh was suspected culprit)\par Mammo/US at [facility] 10/19, going next mo\par Anita 9/17\par Opho regularly, needs new name, prior retiring\par Drinks 1-3 glasses of wine/d\par Not exercising. Gaining wt during Covid pandemic.\par \par c/o worsening gerd lately, Tums some relief, wants something sent in. Does drink signif amt  etoh, but not right before bed. \par on Actonel but takes once a mo in am.\par c/o Varicose vv, would like them treated\par HLD w elev ASCVD risk score- would like to work on diet/exercise; (quit smoking 45 yrs ago.)\par Reports healthy diet but no exercise; is gaining wt\par BP running high at doctor visits\par

## 2020-09-14 NOTE — PHYSICAL EXAM
[No Acute Distress] : no acute distress [Well Nourished] : well nourished [Well Developed] : well developed [Normal Sclera/Conjunctiva] : normal sclera/conjunctiva [Well-Appearing] : well-appearing [PERRL] : pupils equal round and reactive to light [EOMI] : extraocular movements intact [Normal Outer Ear/Nose] : the outer ears and nose were normal in appearance [Normal Oropharynx] : the oropharynx was normal [No JVD] : no jugular venous distention [Supple] : supple [No Lymphadenopathy] : no lymphadenopathy [Thyroid Normal, No Nodules] : the thyroid was normal and there were no nodules present [No Respiratory Distress] : no respiratory distress  [Clear to Auscultation] : lungs were clear to auscultation bilaterally [No Accessory Muscle Use] : no accessory muscle use [Normal Rate] : normal rate  [Normal S1, S2] : normal S1 and S2 [Regular Rhythm] : with a regular rhythm [No Murmur] : no murmur heard [No Carotid Bruits] : no carotid bruits [No Abdominal Bruit] : a ~M bruit was not heard ~T in the abdomen [No Varicosities] : no varicosities [Pedal Pulses Present] : the pedal pulses are present [No Edema] : there was no peripheral edema [No Palpable Aorta] : no palpable aorta [No Extremity Clubbing/Cyanosis] : no extremity clubbing/cyanosis [Soft] : abdomen soft [Non Tender] : non-tender [Non-distended] : non-distended [No Masses] : no abdominal mass palpated [No HSM] : no HSM [Normal Bowel Sounds] : normal bowel sounds [Normal Posterior Cervical Nodes] : no posterior cervical lymphadenopathy [Normal Anterior Cervical Nodes] : no anterior cervical lymphadenopathy [No CVA Tenderness] : no CVA  tenderness [No Spinal Tenderness] : no spinal tenderness [No Joint Swelling] : no joint swelling [Grossly Normal Strength/Tone] : grossly normal strength/tone [No Rash] : no rash [Coordination Grossly Intact] : coordination grossly intact [No Focal Deficits] : no focal deficits [Normal Gait] : normal gait [Normal Affect] : the affect was normal [Normal Insight/Judgement] : insight and judgment were intact

## 2020-10-01 DIAGNOSIS — J32.9 CHRONIC SINUSITIS, UNSPECIFIED: ICD-10-CM

## 2020-12-28 ENCOUNTER — NON-APPOINTMENT (OUTPATIENT)
Age: 75
End: 2020-12-28

## 2020-12-29 ENCOUNTER — NON-APPOINTMENT (OUTPATIENT)
Age: 75
End: 2020-12-29

## 2020-12-29 ENCOUNTER — EMERGENCY (EMERGENCY)
Facility: HOSPITAL | Age: 75
LOS: 1 days | Discharge: HOME | End: 2020-12-29
Attending: EMERGENCY MEDICINE
Payer: MEDICARE

## 2020-12-29 VITALS
HEART RATE: 81 BPM | TEMPERATURE: 98 F | OXYGEN SATURATION: 98 % | DIASTOLIC BLOOD PRESSURE: 84 MMHG | SYSTOLIC BLOOD PRESSURE: 143 MMHG | RESPIRATION RATE: 20 BRPM

## 2020-12-29 VITALS
HEIGHT: 63 IN | HEART RATE: 107 BPM | RESPIRATION RATE: 18 BRPM | OXYGEN SATURATION: 98 % | TEMPERATURE: 98 F | WEIGHT: 154.98 LBS | SYSTOLIC BLOOD PRESSURE: 159 MMHG | DIASTOLIC BLOOD PRESSURE: 74 MMHG

## 2020-12-29 LAB
ALBUMIN SERPL ELPH-MCNC: 4.4 G/DL — SIGNIFICANT CHANGE UP (ref 3.3–5)
ALP SERPL-CCNC: 83 U/L — SIGNIFICANT CHANGE UP (ref 40–120)
ALT FLD-CCNC: 26 U/L — SIGNIFICANT CHANGE UP (ref 10–45)
ANION GAP SERPL CALC-SCNC: 13 MMOL/L — SIGNIFICANT CHANGE UP (ref 5–17)
APTT BLD: 30.4 SEC — SIGNIFICANT CHANGE UP (ref 27.5–35.5)
AST SERPL-CCNC: 36 U/L — SIGNIFICANT CHANGE UP (ref 10–40)
BILIRUB SERPL-MCNC: 0.4 MG/DL — SIGNIFICANT CHANGE UP (ref 0.2–1.2)
BUN SERPL-MCNC: 10 MG/DL — SIGNIFICANT CHANGE UP (ref 7–23)
CALCIUM SERPL-MCNC: 9.4 MG/DL — SIGNIFICANT CHANGE UP (ref 8.4–10.5)
CHLORIDE SERPL-SCNC: 106 MMOL/L — SIGNIFICANT CHANGE UP (ref 96–108)
CO2 SERPL-SCNC: 20 MMOL/L — LOW (ref 22–31)
CREAT SERPL-MCNC: 0.68 MG/DL — SIGNIFICANT CHANGE UP (ref 0.5–1.3)
CRP SERPL-MCNC: 0.39 MG/DL — SIGNIFICANT CHANGE UP (ref 0–0.4)
ERYTHROCYTE [SEDIMENTATION RATE] IN BLOOD: 16 MM/HR — SIGNIFICANT CHANGE UP (ref 0–20)
GLUCOSE SERPL-MCNC: 103 MG/DL — HIGH (ref 70–99)
HCT VFR BLD CALC: 42.9 % — SIGNIFICANT CHANGE UP (ref 34.5–45)
HGB BLD-MCNC: 14.4 G/DL — SIGNIFICANT CHANGE UP (ref 11.5–15.5)
INR BLD: 1.02 RATIO — SIGNIFICANT CHANGE UP (ref 0.88–1.16)
MCHC RBC-ENTMCNC: 30.8 PG — SIGNIFICANT CHANGE UP (ref 27–34)
MCHC RBC-ENTMCNC: 33.6 GM/DL — SIGNIFICANT CHANGE UP (ref 32–36)
MCV RBC AUTO: 91.9 FL — SIGNIFICANT CHANGE UP (ref 80–100)
NRBC # BLD: 0 /100 WBCS — SIGNIFICANT CHANGE UP (ref 0–0)
PLATELET # BLD AUTO: 146 K/UL — LOW (ref 150–400)
POTASSIUM SERPL-MCNC: 5.2 MMOL/L — SIGNIFICANT CHANGE UP (ref 3.5–5.3)
POTASSIUM SERPL-SCNC: 5.2 MMOL/L — SIGNIFICANT CHANGE UP (ref 3.5–5.3)
PROT SERPL-MCNC: 7.4 G/DL — SIGNIFICANT CHANGE UP (ref 6–8.3)
PROTHROM AB SERPL-ACNC: 12.2 SEC — SIGNIFICANT CHANGE UP (ref 10.6–13.6)
RBC # BLD: 4.67 M/UL — SIGNIFICANT CHANGE UP (ref 3.8–5.2)
RBC # FLD: 12.9 % — SIGNIFICANT CHANGE UP (ref 10.3–14.5)
SARS-COV-2 RNA SPEC QL NAA+PROBE: SIGNIFICANT CHANGE UP
SODIUM SERPL-SCNC: 139 MMOL/L — SIGNIFICANT CHANGE UP (ref 135–145)
WBC # BLD: 7.04 K/UL — SIGNIFICANT CHANGE UP (ref 3.8–10.5)
WBC # FLD AUTO: 7.04 K/UL — SIGNIFICANT CHANGE UP (ref 3.8–10.5)

## 2020-12-29 PROCEDURE — 85610 PROTHROMBIN TIME: CPT

## 2020-12-29 PROCEDURE — 85730 THROMBOPLASTIN TIME PARTIAL: CPT

## 2020-12-29 PROCEDURE — 70496 CT ANGIOGRAPHY HEAD: CPT

## 2020-12-29 PROCEDURE — 70498 CT ANGIOGRAPHY NECK: CPT | Mod: 26

## 2020-12-29 PROCEDURE — 99284 EMERGENCY DEPT VISIT MOD MDM: CPT | Mod: CS,GC

## 2020-12-29 PROCEDURE — 87635 SARS-COV-2 COVID-19 AMP PRB: CPT

## 2020-12-29 PROCEDURE — 70496 CT ANGIOGRAPHY HEAD: CPT | Mod: 26

## 2020-12-29 PROCEDURE — 70450 CT HEAD/BRAIN W/O DYE: CPT | Mod: XU

## 2020-12-29 PROCEDURE — 85652 RBC SED RATE AUTOMATED: CPT

## 2020-12-29 PROCEDURE — 70498 CT ANGIOGRAPHY NECK: CPT

## 2020-12-29 PROCEDURE — 76512 OPH US DX B-SCAN: CPT | Mod: 26,50

## 2020-12-29 PROCEDURE — 93010 ELECTROCARDIOGRAM REPORT: CPT | Mod: GC

## 2020-12-29 PROCEDURE — 93005 ELECTROCARDIOGRAM TRACING: CPT

## 2020-12-29 PROCEDURE — 80053 COMPREHEN METABOLIC PANEL: CPT

## 2020-12-29 PROCEDURE — 76512 OPH US DX B-SCAN: CPT

## 2020-12-29 PROCEDURE — 86140 C-REACTIVE PROTEIN: CPT

## 2020-12-29 PROCEDURE — 99284 EMERGENCY DEPT VISIT MOD MDM: CPT

## 2020-12-29 PROCEDURE — 85027 COMPLETE CBC AUTOMATED: CPT

## 2020-12-29 NOTE — ED PROVIDER NOTE - PROGRESS NOTE DETAILS
Peewee, PGY2 - Spoke to PCP Dr. Lopez, requesting CTH, CTA head/neck, ophtho eval. Confirmed that carotid doppler was performed 3mo ago, but unsure of the results, possibly mild stenosis. Pt evaluated by ophtho, states eye exam reassuring, sx resolved, low suspicion for GCA, pt can f/u outpatient in 1-2 wks from their perspective. Peewee, PGY2 - CT/CTA non-actionable. Attempted to contact PCP Dr. Lopez Peewee, PGY2 - CT/CTA non-actionable. Discussed results with PCP Dr. Lopez, states pt can f/u in office Peewee, PGY2 – Pt was re-evaluated at bedside, VSS, feeling well overall. Pt requesting COVID test, for concerns of exposure in the hospital. Results were discussed with patient as well as return precautions and follow up plan with PCP and/or specialist. Time was taken to answer any questions that the patient had before providing them with discharge paperwork.

## 2020-12-29 NOTE — ED PROVIDER NOTE - NSFOLLOWUPCLINICS_GEN_ALL_ED_FT
[facility]  Neurology  [address] [place] [address] - [address] [place] [address]  Phone: [phone]  Fax:   Follow Up Time: 4-6 Days

## 2020-12-29 NOTE — ED PROVIDER NOTE - PATIENT PORTAL LINK FT
You can access the FollowMyHealth Patient Portal offered by [facility] by registering at the following website: [email or web address]. By joining [facility]’s FollowMyHealth portal, you will also be able to view your health information using other applications (apps) compatible with our system.

## 2020-12-29 NOTE — ED PROVIDER NOTE - CLINICAL SUMMARY MEDICAL DECISION MAKING FREE TEXT BOX
raymundo - 75 f with/out ho cad or cva- ( ? TGA in past) presents w 6 weeks of l arm pain worse in int rotaion no trauma yesterday pt endorsed distoted vision no ha no speech change no unsteasy gait - says felt fuzzy - unsire if monocular or binocular - - no symptom free no focal deficit no field cut vison 20/25 bl - perrl- no temp tenderness - posssible polymyalgia rheumatic with shoulder girdle pain - will check esr howver no tempor ttp - pocus to eval for vitral hemm or detech -- ct cta for neuro eval however atypical presentation for tia ( not amarosis fugax picture ) -- will reeval and dw dr qureshi after arredondo

## 2020-12-29 NOTE — ED ADULT NURSE NOTE - NSIMPLEMENTINTERV_GEN_ALL_ED
Implemented All Universal Safety Interventions:  [place] to call system. Call bell, personal items and telephone within reach. Instruct patient to call for assistance. Room bathroom lighting operational. Non-slip footwear when patient is off stretcher. Physically safe environment: no spills, clutter or unnecessary equipment. Stretcher in lowest position, wheels locked, appropriate side rails in place.

## 2020-12-29 NOTE — ED ADULT NURSE NOTE - OBJECTIVE STATEMENT
75yr old female with a PMH of osteoporosis coming in with occular changes and lightheadedness since Sunday. Pt states that it comes and goes. Pt called her PMD, Dr. Doan, who told her to come to the ED to get checked out. Pt appears comfortable and calm. Pt is A&Ox3. Pt denies any n/v/d, fever, chills, or SOB. Pts skin is normal for race and warm to touch. VSS. +2 palpable peripheral pulses. Capillary refill <2 seconds. Call bell at bedside. Will continue to monitor.

## 2020-12-29 NOTE — CONSULT NOTE ADULT - ASSESSMENT
Assessment and Recommendations:  75y female w/ distorted vision x 6 hours on Sunday here for eval, vision back to normal now. On exam, BCVA 20/20 OU, no APD, EOM full, eyes ortho in primary gaze, IOP wnl. CVF and color intact OU. Anterior exam 2+ SPK OU, DFE w/ pigmentary changes inferiorly.   - no ophthalmologic intervention  - patient's distorted vision may be monocular or binocular diplopia vs metamorphopsia. Unknown if diplopia was present, though given ocular surface disease, possible that her distorted vision was due to dry eyes. If pt had metamorphopsia from mac hole or AMD then would expect constant symptoms, not resolution after a few hours.   - recommend artificial tears 3-4 times per day  - recommend outpatient follow-up within 1 week  - AUSTIN Esquivel (attending)    Outpatient follow-up: Patient should follow-up with his/her ophthalmologist or with [facility] Department of Ophthalmology within 1 week of after discharge at:    [address]  [place] [address]  [phone]    Kadeem Chapa MD, PGY-3  Pager: [phone]/[facility]: [id number]

## 2020-12-29 NOTE — ED CLERICAL - NS ED CLERK NOTE PRE-ARRIVAL INFORMATION; ADDITIONAL PRE-ARRIVAL INFORMATION
CC/Reason For referral: distorted vision x 10 hours, please do stroke w/u for possible TIA  Preferred Consultant(if applicable): neuro, opthalmology  Who admits for you (if needed): na  Do you have documents you would like to fax over? no  Would you still like to speak to an ED attending? please call with results of evaluation, thanks

## 2020-12-29 NOTE — ED PROVIDER NOTE - PHYSICAL EXAMINATION
I have reviewed the triage vital signs.  Const: AAOx3, in NAD  Eyes: PERRL BL, EOMI BL, normal visual acuity BL  HENT: NCAT, Neck supple, oral mucosa moist  CV: RRR, +S1, S2  Resp: CTAB, no respiratory distress  GI: Abdomen soft, NTND, no guarding, no CVA tenderness  Extremities: No peripheral edema,  2+ radial and DP pulses  Skin: Warm, well perfused, no rash  MSK: No gross deformities appreciated  Neuro: CN2-12 grossly intact, MS +5/5 in UE and LE BL, gross sensation intact in UE and LE BL, gait smooth and coordinated, negative rhomberg, negative pronator drift   Psych: Appropriate mood and affect

## 2020-12-29 NOTE — ED ADULT NURSE NOTE - GASTROINTESTINAL ASSESSMENT
R1 [facility]G PROGRESS NOTE    Patient Name: Meka Rand   Date: 05/14/20    Subjective and Overnight Events:     No acute events overnight,  70s systolic BP in the 130s this a.m.  Patient reports feeling well today, +BM, denies any chest pain, breath or abdominal pain. Edema improving      HPI:  82 yo female with PMHx of Duodenal ulcer, HTN, HLD how arrives to [facility] on 5/1 as a transfer from [facility] for surgical evaluation. 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 Other complications included bilateral hydropneumothoraces and empyemas requiring bilateral chest tubes. At time of admission, CT surgery was deemed too high risk and GI was consulted for endoscopic approach. Patient underwent endoscopic repair but with residual leak on esophagram then underwent EGD clippings with still persistent leak and then esophageal stent which later migrated and had to be removed revealing a second esophageal tear. Due to malnutrition, Patient has PICC line and has been receiving TPN at [facility] and arrives on IV Clindamycin and Micafungin after having a diffuse skin drug reaction while on Vancomycin, Cefepime and flagyl. General surgery consulted for surgical evaluation regarding possible Esophagectomy with gastric anastomoses versus esophageal diversion procedure. Please see previous discharge note for full details. When seen at bedside, patient is alert and able to answer questions but very fatigued and deconditioned. Denies any fever, chills, headache, nausea or vomiting. Denies any subjective shortness of breath.        Review of Systems:  Review of Systems   Constitutional: Positive for activity change and fatigue. Negative for chills and fever.   HENT: Negative for postnasal drip, sinus pressure and sinus pain.    Eyes: Negative for pain, discharge and itching.   Respiratory: Positive for cough. Negative for  chest tightness and shortness of breath.         Productive cough   Cardiovascular: Negative for chest pain and leg swelling.   Gastrointestinal: Negative for abdominal distention, nausea and vomiting.   Skin: Negative for rash.   Neurological: Positive for weakness. Negative for dizziness, light-headedness and numbness.   Psychiatric/Behavioral: Negative for agitation and confusion.       Inpatient Medications:  • metoPROLOL tartrate  50 mg Per J Tube BID   • lisinopril  5 mg Per J Tube Daily   • triamcinolone   Topical BID   • heparin (porcine)  5,000 Units Subcutaneous 3 times per day   • fat emulsion  250 mL Intravenous Q24H   • micafungin (MYCAMINE) IVPB  100 mg Intravenous Daily   • pantoprazole  40 mg Intravenous 2 times per day   • tigecycline (TYGACIL) IVPB  50 mg Intravenous 2 times per day      hydrALAZINE (APRESOLINE) injection, melatonin, guaiFENesin-DM), HYDROmorphone, dextrose, ondansetron, sodium chloride, acetaminophen, diphenhydrAMINE   Medications Prior to Admission   Medication Sig Dispense Refill   • acetaminophen (TYLENOL) 325 MG tablet Take 2 tablets by mouth every 4 hours as needed for Pain.     • mirtazapine (REMERON) 7.5 MG tablet Take 1 tablet by mouth nightly. 30 tablet 0   • dilTIAZem (CARDIZEM) 60 MG tablet Take 1 tablet by mouth every 6 hours. 120 tablet 0   • enoxaparin (LOVENOX) 40 MG/0.4ML injectable solution Inject 0.4 mLs into the skin every evening. 12 mL 0   • fat emulsion 20 % injection Inject 250 mLs into the vein every 48 hours. Do not start before May 2, 2020. 250 mL 0   • ondansetron (ZOFRAN) 4 MG/2ML injectable solution Inject 2 mLs into the vein every 8 hours as needed (nausea). 60 mL 0   • digoxin (LANOXIN) 0.25 MG/ML injection Inject 0.5 mLs into the vein daily. Do not start before May 2, 2020. 30 mL 0   • [] clindamycin (CLEOCIN) 300 MG/50ML in dextrose 5% premix IVPB Inject 50 mLs into the vein every 8 hours for 10 days. 1500 mL 0   • diphenhydrAMINE (BENADRYL)  50 MG/ML injectable solution Inject 0.5 mLs into the vein every 4 hours as needed for Itching.     • acetaminophen (TYLENOL) 650 MG suppository Place 1 suppository rectally every 6 hours as needed for Fever or Pain.     • pantoprazole (PROTONIX) 40 MG tablet Take 1 tablet by mouth every 12 hours. 60 tablet 0   • furosemide (LASIX) 20 MG tablet Take 1 tablet by mouth daily. Do not start before May 2, 2020. 30 tablet 0        Objective:  Temp:  [97.5 °F (36.4 °C)-98.4 °F (36.9 °C)] 98.4 °F (36.9 °C)  Heart Rate:  [72-92] 74  Resp:  [16-18] 16  BP: (131-154)/(67-83) 131/67     Physical Exam:  Physical Exam   Constitutional: She is oriented to person, place, and time. No distress.   Chronically ill in appearance, Deconditioned   HENT:   Head: Normocephalic and atraumatic.   Left sided neck sutures for esophageal diversion, ostomy bag in place - no signs of erythema or infection   Eyes: Pupils are equal, round, and reactive to light. Conjunctivae and EOM are normal. Right eye exhibits no discharge. Left eye exhibits no discharge. No scleral icterus.   Cardiovascular: Normal rate and regular rhythm.   No murmur heard.  Normal rhythm at present, tachycardic   Pulmonary/Chest: Effort normal and breath sounds normal. No respiratory distress. She has no wheezes. She has no rales.   decreased bibasilar breath sounds  Rhonchi bilaterally   Abdominal: Soft. Bowel sounds are normal. She exhibits no distension. There is no abdominal tenderness. There is no rebound and no guarding.   Musculoskeletal:         General: No tenderness, deformity or edema.      Comments: Able to assess passive ROM due to weakness   Neurological: She is alert and oriented to person, place, and time.   Skin: Skin is warm and dry. No rash noted. She is not diaphoretic. No erythema. No pallor.   Drug rash has resolved, residual skin peeling   Psychiatric: Mood and affect normal.       Intake/Output Summary (Last 24 hours) at 5/14/2020 5971  Last data filed  at 5/14/2020 0628  Gross per 24 hour   Intake 2208.55 ml   Output 1485 ml   Net 723.55 ml       Imaging:  Radiology:  CT Chest Abdomen w/ Contrast 5/2:  IMPRESSION:   Increasing right-sided hydropneumothorax.   Decreasing left pleural fluid with bilateral chest tubes in place.    No acute findings within the visualized abdomen..    CXR 5/2: Stable compared to 4/30: Bibasilar atelectasis and stable pleural fluid    2V CXR 5/5 to evaluate R chest tube airleak:  IMPRESSION:  1.  Decreasing small medial right basilar pneumothorax.  2.  Bibasilar opacities and small right pleural effusion are otherwise unchanged.    CXR 5/6 post op/sp central line placement:  IMPRESSION:  1.   Right central line in appropriate position.  Support apparatus as described.  2.   Improved bilateral lung aeration.  3.   Additional findings as described.    CT chest w/ contrast 5/11:  IMPRESSION:  1.  Improved right hydropneumothorax with minimal residual air and fluid.  2.  Slightly smaller left loculated pleural effusion.  3.  Scattered mild tree-in-bud opacities and bronchial wall thickening, similar to prior, for which endobronchial infection or aspiration is possible.  Electronically Signed by: DAQUAN KEY MD   Signed on: 5/11/2020 11:26 AM         Admission EKG:   Encounter Date: 05/01/20   Electrocardiogram 12-Lead   Result Value    Ventricular Rate EKG/Min (BPM) 107    Atrial Rate (BPM) 108    UT-Interval (MSEC) 139    QRS-Interval (MSEC) 85    QT-Interval (MSEC) 306    QTc 409    P Axis (Degrees) 40    R Axis (Degrees) -41    T Axis (Degrees) 177    REPORT TEXT      Sinus tachycardia  Multiple premature complexes, vent & supraven  Left anterior fascicular block  LVH with secondary repolarization abnormality  Confirmed by ROSY LEZAMA MD ([id number]) on 5/9/2020 9:54:34 AM         Cultures:  Blood Cx x 2: NGTD 4/29    -Repeat BLC x2: NGTD 5/2   -Repeat BLC x2: NGTD 5/10  Pleural Fluid: L Chest tube: NGTD 5/3  R Chest tube culture:  5/3: Cx 5/3: staph epidermidis, candida glabrata, rare diphtheroids  Throacentesis Fluid 4/24 - NGTD     Primary Care Physician  Deloris Nair MD     Assessment and Plan:      #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 Fib   #HTN  -Continue Metoprolol tartrate to 50 mg BID, continue Lisinopril 5 mg po qd     #Anemia - 2/2 multifactorial and acute blood loss post-op - s/p 1 u pRBC 5/4  -CBC daily, transfuse hgb goal >7     #Severe Calorie Malnutrition - s/p Jejunostomy tube placed 5/6   #Gastric Outlet obstruction  -D/C TPN today  -STRICT NPO, continue PPI, J-tube feedings - 40cc CONTINUOUS feed +20cc FWF q3, do not advance     #Deconditioning  -PT/OT on consult  -Requested 6W to re-eval 5/14    #HLD - Continue home simvastatin 20 mg po qd     #Elevated Alk Phos - likely 2/2 bone turnover and chronic debility    F: No additional fluids  E: Replete PRN  N: TPN @ 63 mL/hr, 40cc CONTINUOUS feed +20cc FWF q3 - do NOT advance  DVT Ppx: Heparin 5000 units Q8H  GI Ppx: Protonix 40 mg IV BID  Code: FULL code, decisional, Spouse is surrogate      Will discuss with attending (Dr. Doran)  Please await final recommendations     Vidal Macias DO  Internal Medicine, PGY-1  Phone #     - - -

## 2020-12-29 NOTE — ED PROVIDER NOTE - NS ED ROS FT
General: Denies fevers or chills  Eyes: Distorted vision, now resolved  ENMT: Denies trouble swallowing or speaking, or sore throat  CV: Denies chest pain or palpitations  Resp: Denies cough or SOB  GI: Denies abdominal pain, nausea, vomiting, diarrhea, or constipation   : Denies painful urination, increased urinary frequency, or blood in urine  Skin: Denies new rashes  Neuro: +Dizziness. Denies headache, numbness, or weakness  MSK: Denies recent trauma

## 2020-12-29 NOTE — ED PROVIDER NOTE - OBJECTIVE STATEMENT
75F PMH includes osteoporosis, TGA sent in by PCP Dr. Lopez for further evaluation of transient visual changes and dizziness 2 days ago. Pt states she developed distorted vision 2 days ago, lasting 6 hrs. A/w dizziness. No headache, eye pain, numbness, tingling, weakness. Patient has also had L shoulder pain x weeks, which is worse with movement. No CP or SOB. Pt called her PCP, who recommended ED evaluation. Given this constellation of sx, PCP requesting neuro and ophtho eval. Pt states vision is currently normal. Has mild dizziness.

## 2020-12-29 NOTE — CONSULT NOTE ADULT - SUBJECTIVE AND OBJECTIVE BOX
[facility] DEPARTMENT OF OPHTHALMOLOGY - INITIAL ADULT CONSULT  -----------------------------------------------------------------------------  Kadeem Chapa MD PGY-3  Pager: [phone]/[facility]: [phone]  -----------------------------------------------------------------------------    HPI: 75F presents to ED for eval of blurry vision which occurred over the weekend. Pt reports on Sunday had an episode which lasted approx 6 hours where she felt her vision was distorted. She felt like she was seeing images next to each other ?double vision. Pt reports after 6 hours this resolved spontaneously. Denies headache, weight loss, jaw claudication, temporal pain, scalp tenderness, fever. Does have history of dry eyes.     PMH: osteoporosis  POcHx: CE/IOL OU  FH: denies glc/amd  Social History: denies etoh/tobacco  Ophthalmic Medications: none  Allergies: NKDA    Review of Systems:  Constitutional: No fever, chills  Eyes: No blurry vision, flashes, floaters, FBS, erythema, discharge, double vision, OU  Neuro: No tremors  Cardiovascular: No chest pain, palpitations  Respiratory: No SOB, no cough  GI: No nausea, vomiting, abdominal pain  : No dysuria  Skin: no rash  Psych: no depression  Endocrine: no polyuria, polydipsia  Heme/lymph: no swelling    VITALS: T(C): 36.9 (12-29-20 @ 10:47)  T(F): 98.4 (12-29-20 @ 10:47), Max: 98.4 (12-29-20 @ 10:47)  HR: 98 (12-29-20 @ 10:47) (98 - 107)  BP: 166/81 (12-29-20 @ 10:47) (159/74 - 166/81)  RR:  (18 - 18)  SpO2:  (98% - 98%)  Wt(kg): --  General: AAO x 3, appropriate mood and affect    Ophthalmology Exam:  Visual acuity (sc): 20/20 OU  Pupils: PERRL OU, no APD  Ttono: 18 OU  Extraocular movements (EOMs): Full OU, no pain, no diplopia  Confrontational Visual Field (CVF): Full OU  Color Plates: 12/12 OU    Pen Light Exam (PLE)  External: Flat OU  Lids/Lashes/Lacrimal Ducts: Flat OU    Sclera/Conjunctiva: W+Q OU  Cornea: 2+ SPK OU  Anterior Chamber: D+F OU    Iris: Flat OU  Lens: PCIOL OU    Fundus Exam: dilated with 1% tropicamide and 2.5% phenylephrine  Approval obtained from primary team for dilation  Patient aware that pupils can remained dilated for at least 4-6 hours  Exam performed with 20D lens    Vitreous: wnl OD, PVD OS  Disc, cup/disc: sharp and pink, 0.4 OU  Macula: wnl OU  Vessels: wnl OU  Periphery: pigmentary changes OU inferiorly.

## 2020-12-29 NOTE — ED PROVIDER NOTE - NSFOLLOWUPINSTRUCTIONS_ED_ALL_ED_FT
Follow up with your primary care physician in 24-48 hours - take copies of your results.      Follow up at [facility] for Adult Op[facility] in 1-2 weeks. Call [phone] to schedule an appointment. Located at [address], [place] [address].    Return to the Emergency Department for worsening or persistent symptoms, and/or ANY NEW OR CONCERNING SYMPTOMS, including visual changes, sudden weakness, numbness, tingling. If you have issues obtaining follow up, please call: [phone] to obtain a doctor or specialist who takes your insurance in your area. Follow up with your primary care physician in 24-48 hours - take copies of your results.      Follow up at [facility] for Adult Ophthalmology within 1 week. Call [phone] to schedule an appointment. Located at [address] [place] [address] [address], [place] [address].    Administer artificial tears 3-4 times per day.    Return to the Emergency Department for worsening or persistent symptoms, and/or ANY NEW OR CONCERNING SYMPTOMS, including visual changes, sudden weakness, numbness, tingling. If you have issues obtaining follow up, please call: [phone] to obtain a doctor or specialist who takes your insurance in your area. Follow up with your primary care physician in 24-48 hours - take copies of your results.      Follow up at [facility] for Adult Ophthalmology within 1 week. Call [phone] to schedule an appointment. Located at [address] [place] [address] [address], [place] [address].    Administer artificial tears 3-4 times per day.    Consider following up with a neurologist.     Return to the Emergency Department for worsening or persistent symptoms, and/or ANY NEW OR CONCERNING SYMPTOMS, including visual changes, sudden weakness, numbness, tingling. If you have issues obtaining follow up, please call: [phone] to obtain a doctor or specialist who takes your insurance in your area.

## 2020-12-30 ENCOUNTER — NON-APPOINTMENT (OUTPATIENT)
Age: 75
End: 2020-12-30

## 2021-01-05 ENCOUNTER — NON-APPOINTMENT (OUTPATIENT)
Age: 76
End: 2021-01-05

## 2021-01-05 ENCOUNTER — APPOINTMENT (OUTPATIENT)
Dept: OPHTHALMOLOGY | Facility: CLINIC | Age: 76
End: 2021-01-05
Payer: MEDICARE

## 2021-01-05 PROCEDURE — 92083 EXTENDED VISUAL FIELD XM: CPT

## 2021-01-05 PROCEDURE — 92004 COMPRE OPH EXAM NEW PT 1/>: CPT

## 2021-01-28 ENCOUNTER — APPOINTMENT (OUTPATIENT)
Dept: OPHTHALMOLOGY | Facility: CLINIC | Age: 76
End: 2021-01-28

## 2021-02-26 ENCOUNTER — APPOINTMENT (OUTPATIENT)
Dept: INTERNAL MEDICINE | Facility: CLINIC | Age: 76
End: 2021-02-26
Payer: MEDICARE

## 2021-02-26 VITALS
WEIGHT: 162 LBS | HEART RATE: 104 BPM | HEIGHT: 62 IN | TEMPERATURE: 97.4 F | BODY MASS INDEX: 29.81 KG/M2 | DIASTOLIC BLOOD PRESSURE: 77 MMHG | OXYGEN SATURATION: 98 % | SYSTOLIC BLOOD PRESSURE: 136 MMHG

## 2021-02-26 DIAGNOSIS — M79.602 PAIN IN LEFT ARM: ICD-10-CM

## 2021-02-26 PROCEDURE — 99213 OFFICE O/P EST LOW 20 MIN: CPT

## 2021-02-26 RX ORDER — ESOMEPRAZOLE MAGNESIUM 20 MG/1
20 CAPSULE, DELAYED RELEASE ORAL
Qty: 30 | Refills: 3 | Status: DISCONTINUED | COMMUNITY
Start: 2020-09-14 | End: 2021-02-26

## 2021-02-26 NOTE — ASSESSMENT
[FreeTextEntry1] : Pt w left upper arm(humerus) pain predating Covid shots.\par Unclear etiol. Most likely MSK given area around deltoid, no mass palpable.\par r/o bony abnormalities w Xray humerus. \par Consider MRI.\par Treat w NSAIDs x 2 wks, w food. Diclofenac gel bid.\par Gentle stretching.\par If not improving will order MRI.\par \par Rib pain on left related to shoveling ( 100 yr blizzard early this mo), pt will monitor.

## 2021-02-26 NOTE — PHYSICAL EXAM
[No Acute Distress] : no acute distress [No Lymphadenopathy] : no lymphadenopathy [Supple] : supple [Clear to Auscultation] : lungs were clear to auscultation bilaterally [Regular Rhythm] : with a regular rhythm [Normal S1, S2] : normal S1 and S2 [No Edema] : there was no peripheral edema [No Rash] : no rash [de-identified] : very anxious [de-identified] : tender to palp at left lateral lower ribs, reproduced w turning body; tender left upper humerus,  also reproduced w ROM shoulder (but pain in arm not rotator cuff area). No warmth, erhtyhema or swelling. No mass palp.

## 2021-02-26 NOTE — HISTORY OF PRESENT ILLNESS
[FreeTextEntry8] : c/o left arm pain since 12/8/20, specifically NOT shoulder per pt. Upper arm in muscle. Was not doing anything eg shoveling, exercising at the  time of onset. No warmth,  no rash there.  She wondered if this is related to her blurry vision for which she went to ER-she states "everyone was so worried about my blurred vision that they ignored my arm pain". (!) note: she did not mention arm pain to me at that time.\par Did have Moderna Covid vax x 2 in that arm  but pain preceded that.\par Later (2/6) she did shovel snow and  her left lateral ribs are still  bothering her but improving;No SOB, NO CP.\par Tried Salon pas on arm, no help. Didn't try advil tylenol etc.\par Right handed.\par She googled what the diagnosis might be. Requesting MRI or US.

## 2021-03-01 ENCOUNTER — RX RENEWAL (OUTPATIENT)
Age: 76
End: 2021-03-01

## 2021-03-04 LAB
ALBUMIN SERPL ELPH-MCNC: 4.2 G/DL
ALP BLD-CCNC: 86 U/L
ALT SERPL-CCNC: 22 U/L
ANION GAP SERPL CALC-SCNC: 9 MMOL/L
AST SERPL-CCNC: 18 U/L
BILIRUB SERPL-MCNC: 0.5 MG/DL
BUN SERPL-MCNC: 19 MG/DL
CALCIUM SERPL-MCNC: 9.5 MG/DL
CHLORIDE SERPL-SCNC: 104 MMOL/L
CHOLEST SERPL-MCNC: 154 MG/DL
CO2 SERPL-SCNC: 27 MMOL/L
CREAT SERPL-MCNC: 0.93 MG/DL
GLUCOSE SERPL-MCNC: 101 MG/DL
HDLC SERPL-MCNC: 84 MG/DL
LDLC SERPL CALC-MCNC: 52 MG/DL
NONHDLC SERPL-MCNC: 70 MG/DL
POTASSIUM SERPL-SCNC: 4.5 MMOL/L
PROT SERPL-MCNC: 6.6 G/DL
SODIUM SERPL-SCNC: 140 MMOL/L
TRIGL SERPL-MCNC: 92 MG/DL

## 2021-03-15 ENCOUNTER — TRANSCRIPTION ENCOUNTER (OUTPATIENT)
Age: 76
End: 2021-03-15

## 2021-04-08 ENCOUNTER — OUTPATIENT (OUTPATIENT)
Dept: OUTPATIENT SERVICES | Facility: HOSPITAL | Age: 76
LOS: 1 days | End: 2021-04-08
Payer: MEDICARE

## 2021-04-08 ENCOUNTER — APPOINTMENT (OUTPATIENT)
Dept: MRI IMAGING | Facility: CLINIC | Age: 76
End: 2021-04-08
Payer: MEDICARE

## 2021-04-08 DIAGNOSIS — M79.603 PAIN IN ARM, UNSPECIFIED: ICD-10-CM

## 2021-04-08 PROCEDURE — G1004: CPT

## 2021-04-08 PROCEDURE — 73218 MRI UPPER EXTREMITY W/O DYE: CPT | Mod: 26,LT,ME

## 2021-04-08 PROCEDURE — 73218 MRI UPPER EXTREMITY W/O DYE: CPT

## 2021-04-12 ENCOUNTER — TRANSCRIPTION ENCOUNTER (OUTPATIENT)
Age: 76
End: 2021-04-12

## 2021-04-19 ENCOUNTER — APPOINTMENT (OUTPATIENT)
Dept: ORTHOPEDIC SURGERY | Facility: CLINIC | Age: 76
End: 2021-04-19
Payer: MEDICARE

## 2021-04-19 VITALS — DIASTOLIC BLOOD PRESSURE: 88 MMHG | HEART RATE: 101 BPM | SYSTOLIC BLOOD PRESSURE: 162 MMHG

## 2021-04-19 PROCEDURE — 20610 DRAIN/INJ JOINT/BURSA W/O US: CPT | Mod: LT

## 2021-04-19 PROCEDURE — 99203 OFFICE O/P NEW LOW 30 MIN: CPT | Mod: 25

## 2021-04-20 NOTE — ADDENDUM
[FreeTextEntry1] : This note was written by Eloisa Ruiz on 04/19/2021 acting solely as a scribe for Dr. Juan Oliveros.\par \par All medical record entries made by the Scribe were at my, Dr. Juan Oliveros, direction and personally dictated by me on 04/19/2021. I have personally reviewed the chart and agree that the record accurately reflects my personal performance of the history, physical exam, assessment and plan.

## 2021-04-20 NOTE — PROCEDURE
[de-identified] : Injection: Left shoulder (Subacromial).\par Indication: Impingement. \par \par A discussion was had with the patient regarding this procedure and all questions were answered. All risks, benefits and alternatives were discussed. These included but were not limited to bleeding, infection, and allergic reaction. Alcohol was used to clean the skin, and betadine was used to sterilize and prep the area in the posterior aspect of the left shoulder. Ethyl chloride spray was then used as a topical anesthetic. A 21-gauge needle was used to inject 4cc of 1% lidocaine and 1cc of 40mg/ml methylprednisolone into the left subacromial space. A sterile bandage was then applied. The patient tolerated the procedure well and there were no complications.

## 2021-04-20 NOTE — PHYSICAL EXAM
[de-identified] : Oriented to time, place, person\par Mood: Normal\par Affect: Normal\par Appearance: Healthy, well appearing, no acute distress.\par Gait: Normal\par Assistive Devices: None\par \par Left shoulder exam:\par \par Inspection: No malalignment, No defects, No atrophy\par Skin: No masses, No lesions\par Neck: Negative Spurling, full ROM, no pain with ROM\par AROM: FF to 160, abduction to 90, ER to 70, IR to upper lumbar\par Painful arc ROM: IR\par Tenderness: No bicipital tenderness, no tenderness to greater tuberosity/RTC insertion, no anterior shoulder/lesser tuberosity tenderness\par Strength: 5/5 ER, 5/5 IR in adduction, 5/5 supraspinatus testing, negative Coffey's test\par AC joint: No TTP/pain with cross arm testing\par Biceps: Speed Negative, Yergason Negative \par Impingement test: + Laura, + Neer\par Vasc: 2+ radial pulse \par Stability: Stable \par Neuro: AIN, PIN, Ulnar nerve intact to motor\par Sensation: Intact to light touch throughout  [de-identified] : The following radiographs were ordered and read by me during this patients visit. I reviewed each radiograph in detail with the patient and discussed the findings as highlighted below.\par \par 3 views of left shoulder were obtained today, 04/19/2021, that show no acute fracture or dislocation. There is no glenohumeral and moderate AC joint degenerative change seen. Type II acromion. There is no significant malalignment. No significant other obvious osseous abnormality, otherwise unremarkable. \par \par Images were reviewed from [facility] dated 02.26.2021.\par \par 3 views of the humerus were obtained, that show no acute fracture or dislocation. There is no degenerative change seen. There is no gross malalignment. No significant other obvious osseous abnormality, otherwise unremarkable.\par \par MRI of left humerus dated 04.08.2021 reports suggests findings suggestive of glenohumeral adhesive capsulitis, as above. Moderate acromioclavicular osteoarthritis. Mild subacromial subdeltoid bursitis.

## 2021-04-20 NOTE — DISCUSSION/SUMMARY
[de-identified] : 76 y/o female with left shoulder impingement. \par \par Patient presents with left shoulder pain radiating through the lateral aspect of the arm.  This is consistent with rotator cuff dysfunction and impingement syndrome.  MRI report suggests adhesive capsulitis, but that is and not clinically evident on examination. A discussion was had with the patient regarding potential sources of inflammatory shoulder discomfort; including rotator cuff tendon dysfunction (including tendonitis vs. internal structural damage), subdeltoid/subacromial bursitis, or impingement of the rotator cuff at the acromion. Other contributing sources of pain may be the acromioclavicular joint or long head of the biceps tendon. Irritation is typically caused either by overhead repetitive activity or as a result of minor injury. \par \par Discussed short-term and long-term outcomes as well as the goal of treatment to reduce pain and restore function. Nonsurgical treatment is typically first-line therapy that may take weeks to months to resolve symptoms; includes rest from overhead activities, NSAIDs, home exercise program versus physical therapy to restore normal strength/ROM/function of the shoulder, and possible corticosteroid injection. Also discussed the role of arthroscopic surgical intervention when nonsurgical treatment does not adequately relieve pain/inflammation. \par \par Cortisone injection therapy given for therapeutic and symptomatic relief. \par \par Recommendations: Begin trial PT, Rx given. Conservative modalities as above (overhead activity rest/activity avoidance until less symptomatic, ice, NSAIDs, home exercise strengthening and stretching program). \par \par Followup: If symptoms progress or persist for additional treatment as needed

## 2021-04-20 NOTE — HISTORY OF PRESENT ILLNESS
[de-identified] : CONSULTATION REPORT\par Referred by Dr. Ingrid Lopez for evaluation of left shoulder pain.\par \par 75 year old RHD female presents today with left shoulder pain since December 2020. She was evaluated by her PMD, x-rays were negative for fx, MRI was obtained of the humerus and patient is here for further treatment. The pain is brought on with overhead movements and internal rotation. Localizes pain to the left upper arm and radiates to lateral shoulder. Naproxen is not helpful. Denies sx, prior injuries, numbness or tingling. Has not tried going to physical therapy. \par \par The patient's past medical history, past surgical history, medications and allergies were reviewed by me today with the patient and documented accordingly. In addition, the patient's family and social history, which were noncontributory to this visit, were reviewed also.

## 2021-04-20 NOTE — CONSULT LETTER
[Dear  ___] : Dear  [unfilled], [Consult Letter:] : I had the pleasure of evaluating your patient, [unfilled]. [Please see my note below.] : Please see my note below. [Consult Closing:] : Thank you very much for allowing me to participate in the care of this patient.  If you have any questions, please do not hesitate to contact me. [Sincerely,] : Sincerely, [FreeTextEntry3] : Juan Oliveros MD\par ______________________________________________\par [facility]: Sports Medicine\par [address], [address], [place] [address]\par (t) [phone]\par (f) [phone]

## 2021-05-25 ENCOUNTER — RX RENEWAL (OUTPATIENT)
Age: 76
End: 2021-05-25

## 2021-06-07 ENCOUNTER — APPOINTMENT (OUTPATIENT)
Dept: ENDOCRINOLOGY | Facility: CLINIC | Age: 76
End: 2021-06-07
Payer: MEDICARE

## 2021-06-07 VITALS — DIASTOLIC BLOOD PRESSURE: 80 MMHG | OXYGEN SATURATION: 98 % | SYSTOLIC BLOOD PRESSURE: 120 MMHG | HEART RATE: 97 BPM

## 2021-06-07 VITALS — HEIGHT: 62.5 IN | TEMPERATURE: 98 F | BODY MASS INDEX: 27.99 KG/M2 | WEIGHT: 156 LBS

## 2021-06-07 PROCEDURE — ZZZZZ: CPT

## 2021-06-07 PROCEDURE — 99214 OFFICE O/P EST MOD 30 MIN: CPT | Mod: 25

## 2021-06-07 PROCEDURE — 96372 THER/PROPH/DIAG INJ SC/IM: CPT

## 2021-06-07 PROCEDURE — 77080 DXA BONE DENSITY AXIAL: CPT | Mod: GA

## 2021-06-07 RX ORDER — DENOSUMAB 60 MG/ML
60 INJECTION SUBCUTANEOUS
Qty: 1 | Refills: 0 | Status: COMPLETED | OUTPATIENT
Start: 2021-06-07

## 2021-06-07 RX ADMIN — DENOSUMAB 60 MG/ML: 60 INJECTION SUBCUTANEOUS at 00:00

## 2021-06-08 RX ORDER — DENOSUMAB 60 MG/ML
60 INJECTION SUBCUTANEOUS
Refills: 0 | Status: ACTIVE | COMMUNITY

## 2021-06-08 RX ORDER — RISEDRONATE SODIUM 150 MG/1
150 TABLET, FILM COATED ORAL
Qty: 3 | Refills: 0 | Status: DISCONTINUED | COMMUNITY
Start: 2019-02-06 | End: 2021-06-08

## 2021-06-08 NOTE — HISTORY OF PRESENT ILLNESS
[Vitamin D (supplements)] : Vitamin D as a dietary supplement [Risedronate (Actonel)] : Risedronate [Patient taking Meds Correctly] : Patient is taking meds correctly [FreeTextEntry1] : No significant interval health changes. No interval surgery, hospitalizations, fractures, or change in medications.\par \par Pt previously on Actonel followed by drug holiday. She was then back on rx, off Actonel  since ~2013. Took Actonel for many years, tolerated well. BMD 12/2016 stable, sl increase in hip. BMD 2017 sl decrease in hip. Last DDS 3 mons. Prior dental implants. No ONJ. No interval fx. BMD 2/2019 indicates continued decrease in the tot hip, fem neck, and proximal radius. Reviewed options of therapy. Pt restarted Actonel 2/2019. Taking correctly, tolerating well. No interval fx, no UGI sx, no thigh pain. No aches & pains. No heartburn. Last DDS within past 6 months. No ONJ. BMD 2/2020 indicates improving osteopenia in spine and total hip, and improving osteoporosis in femoral neck and proximal radius.\par \par Had hematuria, dx with small stones saw urology Dr. Miranda.

## 2021-06-08 NOTE — ASSESSMENT
[Denosumab Therapy] : Risks  and benefits of denosumab therapy were discussed with the patient including eczema, cellulitis, osteonecrosis of the jaw and atypical femur fractures [FreeTextEntry1] : 75 year-old female with osteoporosis. \par \par Pt was on Actonel until ~2013. She then started drug holiday. BMD essentially stable; 2014 sl decrease in proximal radius, 2016 increased in tot hip, 2017 sl decrease in tot hip and fem neck. No interval fx. BMD 2/2019 indicates continued decrease in the tot hip, fem neck, and proximal radius. Reviewed options of therapy. Pt had elected to restart Actonel 2/2019. Taking correctly, tolerating well. No interval fx, no UGI sx, no thigh pain. No aches & pains. No heartburn. No ONJ. BMD 2/2020 indicates improving osteopenia in spine and total hip, and improving osteoporosis in femoral neck and proximal radius. BMD 6/2021 indicates worsened osteopenia in spine and worsened low osteoporosis in femoral neck and proximal radius, and stable osteopenia in total hip. BMD results reviewed w/ pt.\par \par Options of therapy reviewed in detail. Pt can consider c/w Actonel and revaluating w/ BMD in 1 year. She can consider transitioning to twice a year Prolia. Risks and benefits discussed including thigh pain, interval fx, and ONJ. I discussed that pt cannot stop Prolia without expecting rapid bone loss and increase in risk for future fx unless they transition to another rx. Furthermore, there is 10 year safety data for Prolia. All questions were answered. Rx information handout provided. Pt understands and elects to transition to Prolia. First dose given today in office 6/2021. \par  Prolia, buy and bill.\par \par H/o Vitamin D insufficiency. C/w Vitamin D 2,000 units per day.\par \par Labs 3/2021 reviewed: Ca 9.5, normal. 6/2020 Vitamin D 34.2, normal. Creatinine 0.93, normal.\par \par F/u in 6 months

## 2021-06-08 NOTE — PROCEDURE
[FreeTextEntry1] : Bone mineral density: 06/07/2021 \par Indication: vs. 2020\par Spine: -2.2 osteopenia, -5.3%\par Total hip: -2.1 osteopenia, no significant change\par Femoral neck: -3.3 osteoporosis, -9.6%\par Proximal radius: -3.4 osteoporosis, -3.5%\par \par Bone mineral density: 02/14/2020 \par Indication: vs. 2019 assess response to medication\par Spine: -1.8 osteopenia, +3.4%\par Total hip: -2.1 osteopenia, +4.0%\par Femoral neck: -2.8 osteoporosis, +11.3%\par Proximal radius: -3.1 osteoporosis +9.9%\par \par Bone mineral density: 02/05/2019 \par Indication: vs. 2017 prior test showed bone loss \par Spine: -2.0 osteopenia, no significant change \par Total hip: -2.3 osteopenia (-4.3%)\par Femoral neck: -3.3 osteoporosis (-5.6%)\par Proximal radius: -3.8 osteoporosis (-6.8%)\par \par Bone mineral density December 13, 2017\par Indication prior test showed rapid bone loss\par Spine -2.0, osteopenia, no significant change\par Total hip -2.0, osteopenia, -4.9% versus 2016 although stable versus 2014\par Femoral neck -3.0, osteoporosis, -3.8%, not significant\par Proximal radius -3.3, osteoporosis, no significant change\par \par Bone mineral density December 13, 2016\par Two-year followup\par Spine -1.8, osteopenia, no significant change\par Total hip -1.7, osteopenia, +6.0%\par Femoral neck -2.8, osteoporosis, no significant change\par Troch -1.4, osteopenia, +4.3%\par Proximal radius -3.2, osteoporosis, no significant change\par \par bone mineral density Nov 26, 2014\par spine -1.8, osteopenia, stable vs 2012\par total hip, -2.1 osteopenia, stable\par femoral neck, -2.8, osteoporosis, stable\par proximal radius -3.1 ,osteoporosis,  -3.8 % vs 2012

## 2021-06-08 NOTE — END OF VISIT
[FreeTextEntry3] : I, Eliot Lima, authored this note working as a medical scribe for Dr. Roman.  06/07/2021.  5:15PM. This note was authored by the medical scribe for me. I have reviewed, edited, and revised the note as needed. I am in agreement with the exam findings, imaging findings, and treatment plan.  Braxton Roman MD

## 2021-09-23 ENCOUNTER — RX RENEWAL (OUTPATIENT)
Age: 76
End: 2021-09-23

## 2021-09-23 RX ORDER — ATORVASTATIN CALCIUM 10 MG/1
10 TABLET, FILM COATED ORAL
Qty: 90 | Refills: 3 | Status: ACTIVE | COMMUNITY
Start: 2020-10-01 | End: 1900-01-01

## 2021-10-28 ENCOUNTER — NON-APPOINTMENT (OUTPATIENT)
Age: 76
End: 2021-10-28

## 2021-10-28 ENCOUNTER — APPOINTMENT (OUTPATIENT)
Dept: INTERNAL MEDICINE | Facility: CLINIC | Age: 76
End: 2021-10-28
Payer: MEDICARE

## 2021-10-28 VITALS
HEART RATE: 85 BPM | SYSTOLIC BLOOD PRESSURE: 126 MMHG | WEIGHT: 156 LBS | OXYGEN SATURATION: 99 % | HEIGHT: 62.5 IN | TEMPERATURE: 97.6 F | DIASTOLIC BLOOD PRESSURE: 78 MMHG | BODY MASS INDEX: 27.99 KG/M2

## 2021-10-28 VITALS — DIASTOLIC BLOOD PRESSURE: 70 MMHG | SYSTOLIC BLOOD PRESSURE: 120 MMHG

## 2021-10-28 DIAGNOSIS — M75.42 IMPINGEMENT SYNDROME OF LEFT SHOULDER: ICD-10-CM

## 2021-10-28 DIAGNOSIS — D69.6 THROMBOCYTOPENIA, UNSPECIFIED: ICD-10-CM

## 2021-10-28 PROCEDURE — G0439: CPT

## 2021-10-28 PROCEDURE — 93000 ELECTROCARDIOGRAM COMPLETE: CPT

## 2021-10-28 RX ORDER — HYDROXYZINE HYDROCHLORIDE 10 MG/1
10 TABLET ORAL
Qty: 30 | Refills: 3 | Status: DISCONTINUED | COMMUNITY
Start: 2020-06-19 | End: 2021-10-28

## 2021-10-28 RX ORDER — NAPROXEN 500 MG/1
500 TABLET ORAL
Qty: 20 | Refills: 1 | Status: DISCONTINUED | COMMUNITY
Start: 2021-02-26 | End: 2021-10-28

## 2021-10-28 RX ORDER — FLUTICASONE PROPIONATE 50 UG/1
50 SPRAY, METERED NASAL TWICE DAILY
Qty: 1 | Refills: 1 | Status: ACTIVE | COMMUNITY
Start: 2021-10-28 | End: 1900-01-01

## 2021-10-29 NOTE — HISTORY OF PRESENT ILLNESS
[FreeTextEntry1] : cpe [de-identified] : 74 yo woman w OP, DCIs, perennial allergic rhinitis, HLD, gerd, IBS, renal stones, dep/anx, thr.penia, etoh use. h/o TGA x1\par Left shoulder pain/impingemt.\par f/h Colon ca. in father\par c/o fatigue, low energy. Long time but getting worse as she gets older. Sleep habits- sometimes insomnia, sometimes sleeps very long hours. No snoring as far as she knows.\par Admits to feeling depressed, has been started on Sertraline in the past but didn't stick with it.\par Hasn't been amenable to therapy but might be now.\par Drinks 2-3 glasses wine every evening.\par c/o left shoulder pain since last yr, didn’t do P.T., willing to try again.\par Takes Zyrtec all yr long, the other meds never helped. Has seen Allergist, had allergy shots, nothing helped.\par Prolia from Dr. Roman twice a yr\par Derm uptodate\par Optho uptodate\par Lawrence due next yr\par \par Covid moderna x 3\par Flu shot yes

## 2021-10-29 NOTE — HEALTH RISK ASSESSMENT
[1] : 2) Feeling down, depressed, or hopeless for several days (1) [HIV test declined] : HIV test declined [Hepatitis C test declined] : Hepatitis C test declined [None] : None [Alone] : lives alone [Retired] : retired [Fully functional (bathing, dressing, toileting, transferring, walking, feeding)] : Fully functional (bathing, dressing, toileting, transferring, walking, feeding) [Fully functional (using the telephone, shopping, preparing meals, housekeeping, doing laundry, using] : Fully functional and needs no help or supervision to perform IADLs (using the telephone, shopping, preparing meals, housekeeping, doing laundry, using transportation, managing medications and managing finances) [Yes] : Yes [No falls in past year] : Patient reported no falls in the past year [Patient reported bone density results were abnormal] : Patient reported bone density results were abnormal [Patient reported colonoscopy was normal] : Patient reported colonoscopy was normal [Fair] : ~his/her~ current health as fair  [Patient reported mammogram was normal] : Patient reported mammogram was normal [Feels Safe at Home] : Feels safe at home [Smoke Detector] : smoke detector [Carbon Monoxide Detector] : carbon monoxide detector [Safety elements used in home] : safety elements used in home [Seat Belt] :  uses seat belt [] : No [de-identified] : 2-3/d [de-identified] : no [de-identified] : healthy [Change in mental status noted] : No change in mental status noted [Language] : denies difficulty with language [Sexually Active] : not sexually active [High Risk Behavior] : no high risk behavior [Reports changes in hearing] : Reports no changes in hearing [Reports changes in vision] : Reports no changes in vision [Reports changes in dental health] : Reports no changes in dental health [MammogramDate] : [place]20 [place] [facility] [place]apSmearComments] : [place] [BoneDensityDate] : 06/21 [ColonoscopyDate] : 09/17

## 2021-10-29 NOTE — ASSESSMENT
[FreeTextEntry1] : Pt as outlined.\par EKG wnl\par Imp: Ongoing fatigue, depression. Advised to resume Sertraline 25 and raise to 50 mg withint 2-3 wks, remain on it at least 3 mos until we f/u.\par Names of psychotherapists provided.\par Importance of exercising discussed, will help depression and energy level.\par Flonase added to Zyrtec\par Routine labs
home

## 2021-10-29 NOTE — PHYSICAL EXAM
[No Acute Distress] : no acute distress [Well Nourished] : well nourished [Well Developed] : well developed [Well-Appearing] : well-appearing [Normal Sclera/Conjunctiva] : normal sclera/conjunctiva [PERRL] : pupils equal round and reactive to light [EOMI] : extraocular movements intact [Normal Outer Ear/Nose] : the outer ears and nose were normal in appearance [Normal Oropharynx] : the oropharynx was normal [No JVD] : no jugular venous distention [No Lymphadenopathy] : no lymphadenopathy [Supple] : supple [Thyroid Normal, No Nodules] : the thyroid was normal and there were no nodules present [No Respiratory Distress] : no respiratory distress  [No Accessory Muscle Use] : no accessory muscle use [Clear to Auscultation] : lungs were clear to auscultation bilaterally [Normal Rate] : normal rate  [Regular Rhythm] : with a regular rhythm [Normal S1, S2] : normal S1 and S2 [No Murmur] : no murmur heard [No Carotid Bruits] : no carotid bruits [No Abdominal Bruit] : a ~M bruit was not heard ~T in the abdomen [No Varicosities] : no varicosities [Pedal Pulses Present] : the pedal pulses are present [No Edema] : there was no peripheral edema [No Palpable Aorta] : no palpable aorta [No Extremity Clubbing/Cyanosis] : no extremity clubbing/cyanosis [Soft] : abdomen soft [Non Tender] : non-tender [Non-distended] : non-distended [No HSM] : no HSM [Normal Bowel Sounds] : normal bowel sounds [Normal Posterior Cervical Nodes] : no posterior cervical lymphadenopathy [Normal Anterior Cervical Nodes] : no anterior cervical lymphadenopathy [No CVA Tenderness] : no CVA  tenderness [No Spinal Tenderness] : no spinal tenderness [No Joint Swelling] : no joint swelling [Grossly Normal Strength/Tone] : grossly normal strength/tone [No Rash] : no rash [Coordination Grossly Intact] : coordination grossly intact [No Focal Deficits] : no focal deficits [Normal Gait] : normal gait [Deep Tendon Reflexes (DTR)] : deep tendon reflexes were 2+ and symmetric [Normal Insight/Judgement] : insight and judgment were intact [Normal Appearance] : normal in appearance [No Masses] : no palpable masses [No Axillary Lymphadenopathy] : no axillary lymphadenopathy [de-identified] : depressed affect

## 2021-11-01 LAB
25(OH)D3 SERPL-MCNC: 34.1 NG/ML
ALBUMIN SERPL ELPH-MCNC: 4.5 G/DL
ALP BLD-CCNC: 82 U/L
ALT SERPL-CCNC: 21 U/L
ANION GAP SERPL CALC-SCNC: 14 MMOL/L
AST SERPL-CCNC: 16 U/L
BASOPHILS # BLD AUTO: 0.03 K/UL
BASOPHILS NFR BLD AUTO: 0.5 %
BILIRUB SERPL-MCNC: 0.4 MG/DL
BUN SERPL-MCNC: 14 MG/DL
CALCIUM SERPL-MCNC: 9.3 MG/DL
CHLORIDE SERPL-SCNC: 104 MMOL/L
CHOLEST SERPL-MCNC: 184 MG/DL
CO2 SERPL-SCNC: 22 MMOL/L
CREAT SERPL-MCNC: 0.84 MG/DL
EOSINOPHIL # BLD AUTO: 0.2 K/UL
EOSINOPHIL NFR BLD AUTO: 3.3 %
ESTIMATED AVERAGE GLUCOSE: 105 MG/DL
GLUCOSE SERPL-MCNC: 90 MG/DL
HBA1C MFR BLD HPLC: 5.3 %
HCT VFR BLD CALC: 37.8 %
HDLC SERPL-MCNC: 77 MG/DL
HGB BLD-MCNC: 11.7 G/DL
IMM GRANULOCYTES NFR BLD AUTO: 0.8 %
LDLC SERPL CALC-MCNC: 88 MG/DL
LYMPHOCYTES # BLD AUTO: 0.86 K/UL
LYMPHOCYTES NFR BLD AUTO: 14.2 %
MAN DIFF?: NORMAL
MCHC RBC-ENTMCNC: 28.8 PG
MCHC RBC-ENTMCNC: 31 GM/DL
MCV RBC AUTO: 93.1 FL
MONOCYTES # BLD AUTO: 0.69 K/UL
MONOCYTES NFR BLD AUTO: 11.4 %
NEUTROPHILS # BLD AUTO: 4.21 K/UL
NEUTROPHILS NFR BLD AUTO: 69.8 %
NONHDLC SERPL-MCNC: 107 MG/DL
PLATELET # BLD AUTO: 161 K/UL
POTASSIUM SERPL-SCNC: 4.7 MMOL/L
PROT SERPL-MCNC: 6.6 G/DL
RBC # BLD: 4.06 M/UL
RBC # FLD: 13.2 %
SODIUM SERPL-SCNC: 140 MMOL/L
TRIGL SERPL-MCNC: 94 MG/DL
TSH SERPL-ACNC: 0.61 UIU/ML
VIT B12 SERPL-MCNC: 314 PG/ML
WBC # FLD AUTO: 6.04 K/UL

## 2021-12-22 ENCOUNTER — APPOINTMENT (OUTPATIENT)
Dept: ENDOCRINOLOGY | Facility: CLINIC | Age: 76
End: 2021-12-22
Payer: MEDICARE

## 2021-12-22 VITALS
RESPIRATION RATE: 15 BRPM | HEART RATE: 89 BPM | OXYGEN SATURATION: 97 % | TEMPERATURE: 97.8 F | SYSTOLIC BLOOD PRESSURE: 120 MMHG | BODY MASS INDEX: 27.27 KG/M2 | WEIGHT: 152 LBS | DIASTOLIC BLOOD PRESSURE: 80 MMHG | HEIGHT: 62.5 IN

## 2021-12-22 DIAGNOSIS — E55.9 VITAMIN D DEFICIENCY, UNSPECIFIED: ICD-10-CM

## 2021-12-22 PROCEDURE — 96372 THER/PROPH/DIAG INJ SC/IM: CPT

## 2021-12-22 PROCEDURE — 99214 OFFICE O/P EST MOD 30 MIN: CPT | Mod: 25

## 2021-12-22 RX ORDER — DENOSUMAB 60 MG/ML
60 INJECTION SUBCUTANEOUS
Qty: 1 | Refills: 0 | Status: COMPLETED | OUTPATIENT
Start: 2021-12-22

## 2021-12-22 RX ADMIN — DENOSUMAB 60 MG/ML: 60 INJECTION SUBCUTANEOUS at 00:00

## 2021-12-23 NOTE — END OF VISIT
[FreeTextEntry3] : I, Eliot Lima, authored this note working as a medical scribe for Dr. Roman.  12/22/2021.  4:15PM. This note was authored by the medical scribe for me. I have reviewed, edited, and revised the note as needed. I am in agreement with the exam findings, imaging findings, and treatment plan.  Braxton Roman MD

## 2021-12-23 NOTE — HISTORY OF PRESENT ILLNESS
[Risedronate (Actonel)] : Risedronate [Denosumab (Prolia)] : Denosumab [FreeTextEntry1] : No significant interval health changes. No interval surgery, hospitalizations, fractures, or change in medications.\par \par Pt previously on Actonel followed by drug holiday. She was then back on rx, off Actonel  since ~2013. Took Actonel for many years, tolerated well. BMD 12/2016 stable, sl increase in hip. BMD 2017 sl decrease in hip. Last DDS 3 mons. Prior dental implants. No ONJ. No interval fx. BMD 2/2019 indicates continued decrease in the tot hip, fem neck, and proximal radius. Reviewed options of therapy. Pt restarted Actonel 2/2019. Took correctly, tolerated well. BMD 2/2020 indicates improving osteopenia in spine and total hip, and improving osteoporosis in femoral neck and proximal radius. BMD 6/2021 indicates worsened osteopenia in spine and worsened low osteoporosis in femoral neck and proximal radius, and stable osteopenia in total hip. Pt transitioned to Prolia 6/2021. Tolerating well. No thigh pain, no interval fx. Normal Ca. Last DDS within past 6 months. No ONJ. Not planning major dental work.\par \par Had hematuria, dx with small stones saw urology Dr. Miranda.\par Had abn  routine mammogram. Bx: atypical ductal hyperplasia

## 2021-12-23 NOTE — ASSESSMENT
[Denosumab Therapy] : Risks  and benefits of denosumab therapy were discussed with the patient including eczema, cellulitis, osteonecrosis of the jaw and atypical femur fractures [FreeTextEntry1] : 76 year-old female with osteoporosis. \par \par Pt was on Actonel until ~2013. She then started drug holiday. BMD essentially stable; 2014 sl decrease in proximal radius, 2016 increased in tot hip, 2017 sl decrease in tot hip and fem neck. BMD 2/2019 indicates continued decrease in the tot hip, fem neck, and proximal radius. Reviewed options of therapy. Pt had elected to restart Actonel 2/2019. Took correctly, tolerated well. BMD 2/2020 indicates improving osteopenia in spine and total hip, and improving osteoporosis in femoral neck and proximal radius. BMD 6/2021 indicates worsened osteopenia in spine and worsened low osteoporosis in femoral neck and proximal radius, and stable osteopenia in total hip. Options for medical therapy discussed in detail. Pt transitioned to Prolia 6/2021. Tolerating well. No thigh pain, no interval fx. Normal Ca. No ONJ. Continue Prolia, buy and bill.\par \par H/o Vitamin D insufficiency. C/w Vitamin D 2,000 units per day.\par \par Labs 10/2021 reviewed: Ca 9.3, normal. Vitamin D 34.1, normal. TSH 0.61, normal. Creatinine 0.84, normal.\par \par F/u in 6 months w/ BMD

## 2022-01-28 ENCOUNTER — APPOINTMENT (OUTPATIENT)
Dept: INTERNAL MEDICINE | Facility: CLINIC | Age: 77
End: 2022-01-28
Payer: MEDICARE

## 2022-01-28 VITALS
BODY MASS INDEX: 27.9 KG/M2 | DIASTOLIC BLOOD PRESSURE: 68 MMHG | SYSTOLIC BLOOD PRESSURE: 129 MMHG | WEIGHT: 155 LBS | OXYGEN SATURATION: 97 % | HEART RATE: 90 BPM | TEMPERATURE: 97.6 F

## 2022-01-28 DIAGNOSIS — C50.919 MALIGNANT NEOPLASM OF UNSPECIFIED SITE OF UNSPECIFIED FEMALE BREAST: ICD-10-CM

## 2022-01-28 DIAGNOSIS — F41.9 ANXIETY DISORDER, UNSPECIFIED: ICD-10-CM

## 2022-01-28 DIAGNOSIS — F32.A DEPRESSION, UNSPECIFIED: ICD-10-CM

## 2022-01-28 DIAGNOSIS — G47.00 INSOMNIA, UNSPECIFIED: ICD-10-CM

## 2022-01-28 PROCEDURE — 99214 OFFICE O/P EST MOD 30 MIN: CPT

## 2022-01-28 RX ORDER — SERTRALINE 25 MG/1
25 TABLET, FILM COATED ORAL DAILY
Qty: 90 | Refills: 3 | Status: DISCONTINUED | COMMUNITY
Start: 2021-10-28 | End: 2022-01-28

## 2022-01-28 RX ORDER — HYDROXYZINE HYDROCHLORIDE 10 MG/1
10 TABLET ORAL
Qty: 60 | Refills: 1 | Status: ACTIVE | COMMUNITY
Start: 2022-01-28 | End: 1900-01-01

## 2022-01-28 NOTE — HISTORY OF PRESENT ILLNESS
[FreeTextEntry1] : f/u [de-identified] : Had Mammo/[place] at [facility] [place], [place]n, had MRI then core biopsy showing Atyp Ductal Hyperplasia and Apocrine metaplasia, needs surgery -probably lumpectomy, will be in 3/22 Dr. Neyda Yao.\par Breast MRI showed "lesions" on liver, had liver MRI showing benign cysts (this is not new).\par Suffering w perennial allergies, takes Zyrtec 10 mg but not helping much. Used to see Allergy but not for many yrs and nothing seemed to change. \par Suffering w depression, tried Sertraline, went up on the dose but not helping, wants to d/c. Didn't call therapist because only wants in-person and most doing Zoom only.\par Sleeps poorly, goes to bed early due to feeling down, then naps during day. Not seeing anyone, other than family (dgtr, grandkids) but they recently had Covid so not even them.\par Covid vax x 3\par

## 2022-01-28 NOTE — ASSESSMENT
[FreeTextEntry1] : Chr allergies, on Zyrtec. Add Hydroxizine 10 mg qhs, can take 2 if one doesn't help.\par This may help anxiety/insomnia as well.\par Urged to call for counseling. \par Hold off on meds, but consider Wellbutrin as next med to ttry.\par Refer to Allergist.\par Lumpectomy coming up in March.\par

## 2022-01-28 NOTE — PHYSICAL EXAM
[No Acute Distress] : no acute distress [Supple] : supple [Clear to Auscultation] : lungs were clear to auscultation bilaterally [Normal S1, S2] : normal S1 and S2 [No Edema] : there was no peripheral edema [de-identified] : anxious, unhappy no anemia, no easy bruising, no jaundice, no swollen lymph nodes.

## 2022-05-06 ENCOUNTER — INPATIENT (INPATIENT)
Facility: HOSPITAL | Age: 77
LOS: 3 days | Discharge: HOME | DRG: 389 | End: 2022-05-10
Attending: STUDENT IN AN ORGANIZED HEALTH CARE EDUCATION/TRAINING PROGRAM | Admitting: STUDENT IN AN ORGANIZED HEALTH CARE EDUCATION/TRAINING PROGRAM
Payer: MEDICARE

## 2022-05-06 VITALS
WEIGHT: 154.1 LBS | DIASTOLIC BLOOD PRESSURE: 74 MMHG | SYSTOLIC BLOOD PRESSURE: 116 MMHG | OXYGEN SATURATION: 96 % | HEART RATE: 98 BPM | TEMPERATURE: 98 F | HEIGHT: 63 IN | RESPIRATION RATE: 19 BRPM

## 2022-05-06 DIAGNOSIS — K56.609 UNSPECIFIED INTESTINAL OBSTRUCTION, UNSPECIFIED AS TO PARTIAL VERSUS COMPLETE OBSTRUCTION: ICD-10-CM

## 2022-05-06 LAB
ALBUMIN SERPL ELPH-MCNC: 4.6 G/DL — SIGNIFICANT CHANGE UP (ref 3.3–5)
ALP SERPL-CCNC: 75 U/L — SIGNIFICANT CHANGE UP (ref 40–120)
ALT FLD-CCNC: 20 U/L — SIGNIFICANT CHANGE UP (ref 10–45)
ANION GAP SERPL CALC-SCNC: 16 MMOL/L — SIGNIFICANT CHANGE UP (ref 5–17)
APPEARANCE UR: CLEAR — SIGNIFICANT CHANGE UP
APTT BLD: 25.5 SEC — LOW (ref 27.5–35.5)
AST SERPL-CCNC: 22 U/L — SIGNIFICANT CHANGE UP (ref 10–40)
BACTERIA # UR AUTO: NEGATIVE — SIGNIFICANT CHANGE UP
BASOPHILS # BLD AUTO: 0.03 K/UL — SIGNIFICANT CHANGE UP (ref 0–0.2)
BASOPHILS NFR BLD AUTO: 0.2 % — SIGNIFICANT CHANGE UP (ref 0–2)
BILIRUB SERPL-MCNC: 0.4 MG/DL — SIGNIFICANT CHANGE UP (ref 0.2–1.2)
BILIRUB UR-MCNC: ABNORMAL
BUN SERPL-MCNC: 17 MG/DL — SIGNIFICANT CHANGE UP (ref 7–23)
CALCIUM SERPL-MCNC: 9.8 MG/DL — SIGNIFICANT CHANGE UP (ref 8.4–10.5)
CHLORIDE SERPL-SCNC: 100 MMOL/L — SIGNIFICANT CHANGE UP (ref 96–108)
CO2 SERPL-SCNC: 21 MMOL/L — LOW (ref 22–31)
COLOR SPEC: YELLOW — SIGNIFICANT CHANGE UP
CREAT SERPL-MCNC: 0.86 MG/DL — SIGNIFICANT CHANGE UP (ref 0.5–1.3)
DIFF PNL FLD: NEGATIVE — SIGNIFICANT CHANGE UP
EGFR: 69 ML/MIN/1.73M2 — SIGNIFICANT CHANGE UP
EOSINOPHIL # BLD AUTO: 0.01 K/UL — SIGNIFICANT CHANGE UP (ref 0–0.5)
EOSINOPHIL NFR BLD AUTO: 0.1 % — SIGNIFICANT CHANGE UP (ref 0–6)
EPI CELLS # UR: 4 /HPF — SIGNIFICANT CHANGE UP
GAS PNL BLDV: SIGNIFICANT CHANGE UP
GLUCOSE SERPL-MCNC: 113 MG/DL — HIGH (ref 70–99)
GLUCOSE UR QL: NEGATIVE — SIGNIFICANT CHANGE UP
HCT VFR BLD CALC: 36.2 % — SIGNIFICANT CHANGE UP (ref 34.5–45)
HGB BLD-MCNC: 11.4 G/DL — LOW (ref 11.5–15.5)
HYALINE CASTS # UR AUTO: 11 /LPF — HIGH (ref 0–2)
IMM GRANULOCYTES NFR BLD AUTO: 0.7 % — SIGNIFICANT CHANGE UP (ref 0–1.5)
INR BLD: 1.14 RATIO — SIGNIFICANT CHANGE UP (ref 0.88–1.16)
KETONES UR-MCNC: ABNORMAL
LEUKOCYTE ESTERASE UR-ACNC: NEGATIVE — SIGNIFICANT CHANGE UP
LIDOCAIN IGE QN: 20 U/L — SIGNIFICANT CHANGE UP (ref 7–60)
LYMPHOCYTES # BLD AUTO: 0.87 K/UL — LOW (ref 1–3.3)
LYMPHOCYTES # BLD AUTO: 6.4 % — LOW (ref 13–44)
MCHC RBC-ENTMCNC: 27.1 PG — SIGNIFICANT CHANGE UP (ref 27–34)
MCHC RBC-ENTMCNC: 31.5 GM/DL — LOW (ref 32–36)
MCV RBC AUTO: 86.2 FL — SIGNIFICANT CHANGE UP (ref 80–100)
MONOCYTES # BLD AUTO: 1.13 K/UL — HIGH (ref 0–0.9)
MONOCYTES NFR BLD AUTO: 8.2 % — SIGNIFICANT CHANGE UP (ref 2–14)
NEUTROPHILS # BLD AUTO: 11.56 K/UL — HIGH (ref 1.8–7.4)
NEUTROPHILS NFR BLD AUTO: 84.4 % — HIGH (ref 43–77)
NITRITE UR-MCNC: NEGATIVE — SIGNIFICANT CHANGE UP
NRBC # BLD: 0 /100 WBCS — SIGNIFICANT CHANGE UP (ref 0–0)
PH UR: 6 — SIGNIFICANT CHANGE UP (ref 5–8)
PLATELET # BLD AUTO: 185 K/UL — SIGNIFICANT CHANGE UP (ref 150–400)
POTASSIUM SERPL-MCNC: 3.6 MMOL/L — SIGNIFICANT CHANGE UP (ref 3.5–5.3)
POTASSIUM SERPL-SCNC: 3.6 MMOL/L — SIGNIFICANT CHANGE UP (ref 3.5–5.3)
PROT SERPL-MCNC: 7.4 G/DL — SIGNIFICANT CHANGE UP (ref 6–8.3)
PROT UR-MCNC: ABNORMAL
PROTHROM AB SERPL-ACNC: 13.1 SEC — SIGNIFICANT CHANGE UP (ref 10.5–13.4)
RBC # BLD: 4.2 M/UL — SIGNIFICANT CHANGE UP (ref 3.8–5.2)
RBC # FLD: 14.3 % — SIGNIFICANT CHANGE UP (ref 10.3–14.5)
RBC CASTS # UR COMP ASSIST: 4 /HPF — SIGNIFICANT CHANGE UP (ref 0–4)
SARS-COV-2 RNA SPEC QL NAA+PROBE: SIGNIFICANT CHANGE UP
SODIUM SERPL-SCNC: 137 MMOL/L — SIGNIFICANT CHANGE UP (ref 135–145)
SP GR SPEC: 1.03 — HIGH (ref 1.01–1.02)
UROBILINOGEN FLD QL: ABNORMAL
WBC # BLD: 13.7 K/UL — HIGH (ref 3.8–10.5)
WBC # FLD AUTO: 13.7 K/UL — HIGH (ref 3.8–10.5)
WBC UR QL: 5 /HPF — SIGNIFICANT CHANGE UP (ref 0–5)

## 2022-05-06 PROCEDURE — 74177 CT ABD & PELVIS W/CONTRAST: CPT | Mod: 26,MA

## 2022-05-06 PROCEDURE — 99285 EMERGENCY DEPT VISIT HI MDM: CPT | Mod: GC

## 2022-05-06 PROCEDURE — 93010 ELECTROCARDIOGRAM REPORT: CPT | Mod: GC

## 2022-05-06 PROCEDURE — 74019 RADEX ABDOMEN 2 VIEWS: CPT | Mod: 26

## 2022-05-06 RX ORDER — SODIUM CHLORIDE 9 MG/ML
1000 INJECTION, SOLUTION INTRAVENOUS ONCE
Refills: 0 | Status: COMPLETED | OUTPATIENT
Start: 2022-05-06 | End: 2022-05-06

## 2022-05-06 RX ORDER — ENOXAPARIN SODIUM 100 MG/ML
40 INJECTION SUBCUTANEOUS EVERY 24 HOURS
Refills: 0 | Status: DISCONTINUED | OUTPATIENT
Start: 2022-05-06 | End: 2022-05-10

## 2022-05-06 RX ORDER — ONDANSETRON 8 MG/1
4 TABLET, FILM COATED ORAL ONCE
Refills: 0 | Status: COMPLETED | OUTPATIENT
Start: 2022-05-06 | End: 2022-05-06

## 2022-05-06 RX ORDER — PIPERACILLIN AND TAZOBACTAM 4; .5 G/20ML; G/20ML
3.38 INJECTION, POWDER, LYOPHILIZED, FOR SOLUTION INTRAVENOUS ONCE
Refills: 0 | Status: COMPLETED | OUTPATIENT
Start: 2022-05-06 | End: 2022-05-06

## 2022-05-06 RX ORDER — SODIUM CHLORIDE 9 MG/ML
1000 INJECTION, SOLUTION INTRAVENOUS
Refills: 0 | Status: DISCONTINUED | OUTPATIENT
Start: 2022-05-06 | End: 2022-05-07

## 2022-05-06 RX ORDER — ACETAMINOPHEN 500 MG
1000 TABLET ORAL ONCE
Refills: 0 | Status: COMPLETED | OUTPATIENT
Start: 2022-05-06 | End: 2022-05-06

## 2022-05-06 RX ADMIN — PIPERACILLIN AND TAZOBACTAM 200 GRAM(S): 4; .5 INJECTION, POWDER, LYOPHILIZED, FOR SOLUTION INTRAVENOUS at 16:51

## 2022-05-06 RX ADMIN — ONDANSETRON 4 MILLIGRAM(S): 8 TABLET, FILM COATED ORAL at 14:13

## 2022-05-06 RX ADMIN — Medication 400 MILLIGRAM(S): at 14:15

## 2022-05-06 RX ADMIN — SODIUM CHLORIDE 1000 MILLILITER(S): 9 INJECTION, SOLUTION INTRAVENOUS at 14:15

## 2022-05-06 RX ADMIN — SODIUM CHLORIDE 75 MILLILITER(S): 9 INJECTION, SOLUTION INTRAVENOUS at 22:03

## 2022-05-06 NOTE — ED PROVIDER NOTE - ATTENDING CONTRIBUTION TO CARE
attending [place]: 76yF h/o osteoporosis, constipation, remote lumpectomy, family hx colon CA, p/w abdominal pain, distention and constipation. Last BM 2 days ago. No relief with Miralax, prunes, dulcolax, and 1 enema. Passing a small amount of gas. +nausea without vomiting. Exam as above. Will obtain labs, CT A/P r/o obstruction, symptomatic treatment, reassess

## 2022-05-06 NOTE — ED ADULT NURSE REASSESSMENT NOTE - NS ED NURSE REASSESS COMMENT FT1
Received report from GRACIA CALVIN. Pt is admitted to surg. Pt pending COVID results and then RTM. Pt has no needs at this time. Pt denies cp, sob, abd pain, leg swelling. Bed is locked and at lowest position. Pt pending dispo.

## 2022-05-06 NOTE — ED PROVIDER NOTE - PHYSICAL EXAMINATION
GENERAL: no acute distress, non-toxic appearing  HEENT: PERRLA, EOMI, normal conjunctiva, oral mucosa moist, neck supple  CARDIAC: regular rate and rhythm, normal S1 and S2,  no appreciable murmurs  PULM: clear to ascultation bilaterally, no crackles, rales, rhonchi, or wheezing  GI: abdomen nondistended, soft, tender to palpation LLQ, no guarding or rebound tenderness  : no CVA tenderness, no suprapubic tenderness  NEURO: alert and oriented x 3, normal speech, no gross neurologic deficit  MSK: no visible deformities  SKIN: no visible rashes, dry, well-perfused  PSYCH: appropriate mood and affect

## 2022-05-06 NOTE — ED ADULT NURSE NOTE - NSIMPLEMENTINTERV_GEN_ALL_ED
Implemented All Universal Safety Interventions:  [place] to call system. Call bell, personal items and telephone within reach. Instruct patient to call for assistance. Room bathroom lighting operational. Non-slip footwear when patient is off stretcher. Physically safe environment: no spills, clutter or unnecessary equipment. Stretcher in lowest position, wheels locked, appropriate side rails in place.

## 2022-05-06 NOTE — ED PROVIDER NOTE - CLINICAL SUMMARY MEDICAL DECISION MAKING FREE TEXT BOX
Patient is a 76y F PMHx osteoporosis, constipation, family hx colon CA, presenting today with abdominal pain. CT a/p r/o SBO. Possible diverticulitis but less likely. Will get labs, imaging, reassess. Pain control.

## 2022-05-06 NOTE — ED ADULT NURSE NOTE - OBJECTIVE STATEMENT
Patient is a 76 year old female complaining of abdominal pain. Patient has history of osteoporosis, family history of colon ca. Patient is A&O x 4. Pt reports abdominal pain and constipation x 2 days. pt reports taking Miralax at home and dulcolax at home with no relief. pt stats she is passing gas. pt states she has nausea. Denies complaints of chest pain, sob, fevers, chills. Safety and comfort maintained. Will continue to monitor.

## 2022-05-06 NOTE — ED PROVIDER NOTE - NSICDXPASTMEDICALHX_GEN_ALL_CORE_FT
PAST MEDICAL HISTORY:  Allergic Rhinitis     Ductal Carcinoma in Situ of Breast s/p lumpectomy    Osteoporosis

## 2022-05-06 NOTE — H&P ADULT - HISTORY OF PRESENT ILLNESS
HPI: 76F with a PMHx of osteoporosis, HLD, lumpectomy who presents with lower abdominal pain that started on Wednesday. Pt states she has been passing gas but no BM since Wednesday. Otherwise denies fever/chills, CP, SOB or vomiting. Pt states she follows with Dr. Davila of GI and last had a colonoscopy 5 years ago. Also admits to a family hx of colon cancer. CT scan was done and showed Wall thickening in the distal sigmoid colon, possibly due to colitis or diverticulitis given the presence of sigmoid diverticula. However, wall thickening is somewhat irregular, raising suspicion for neoplasm. Suggest correlation with colonoscopy. Mild distention of the sigmoid and descending colon upstream to the site of sigmoid thickening, concerning for large bowel obstruction, likely partial. Moderate to large hiatal hernia. Admitted to surgery for monitoring of LBO.        PAST MEDICAL & SURGICAL HISTORY:  Osteoporosis    Allergic Rhinitis    Ductal Carcinoma in Situ of Breast  s/p lumpectomy        REVIEW OF SYSTEMS    10 point review of systems was assessed and is unremarkable other than what was mentioned in the HPI    Allergies    Fosamax (Unknown)    Intolerances      FAMILY HISTORY:    unless noted, no significant family hx with Mother, Father, Siblings    Vital Signs Last 24 Hrs  T(C): 36.8 (06 May 2022 13:46), Max: 36.8 (06 May 2022 13:46)  T(F): 98.3 (06 May 2022 13:46), Max: 98.3 (06 May 2022 13:46)  HR: 99 (06 May 2022 13:46) (98 - 99)  BP: 140/71 (06 May 2022 13:46) (116/74 - 140/71)  BP(mean): --  RR: 18 (06 May 2022 13:46) (18 - 19)  SpO2: 99% (06 May 2022 13:46) (96% - 99%)    General: WN/WD NAD  Neurology: Awake, nonfocal, SELLERS x 4  Respiratory: non labored breath sounds on RA  CV: RRR, S1S2, no murmurs, rubs or gallops  Abdominal: Soft, LLQ TTP, ND  Psych: Oriented x 3, normal affect    LABS:                        11.4   13.70 )-----------( 185      ( 06 May 2022 13:54 )             36.2     05-06    137  |  100  |  17  ----------------------------<  113<H>  3.6   |  21<L>  |  0.86    Ca    9.8      06 May 2022 13:54    TPro  7.4  /  Alb  4.6  /  TBili  0.4  /  DBili  x   /  AST  22  /  ALT  20  /  AlkPhos  75      PT/INR - ( 06 May 2022 14:33 )   PT: 13.1 sec;   INR: 1.14 ratio         PTT - ( 06 May 2022 14:33 )  PTT:25.5 sec  Urinalysis Basic - ( 06 May 2022 13:54 )    Color: Yellow / Appearance: Clear / S.030 / pH: x  Gluc: x / Ketone: Moderate  / Bili: Small / Urobili: 3 mg/dL   Blood: x / Protein: 30 mg/dL / Nitrite: Negative   Leuk Esterase: Negative / RBC: 4 /hpf / WBC 5 /HPF   Sq Epi: x / Non Sq Epi: 4 /hpf / Bacteria: Negative        RADIOLOGY & ADDITIONAL STUDIES:

## 2022-05-06 NOTE — H&P ADULT - ASSESSMENT
76F with a PMHx of osteoporosis, HLD, lumpectomy who presents with lower abdominal pain that started on Wednesday. Pt states she has been passing gas but no BM since Wednesday. Otherwise denies fever/chills, CP, SOB or vomiting. Pt states she follows with Dr. Davila of GI and last had a colonoscopy 5 years ago. Also admits to a family hx of colon cancer. CT scan was done and showed Wall thickening in the distal sigmoid colon, possibly due to colitis or diverticulitis given the presence of sigmoid diverticula. However, wall thickening is somewhat irregular, raising suspicion for neoplasm. Suggest correlation with colonoscopy. Mild distention of the sigmoid and descending colon upstream to the site of sigmoid thickening, concerning for large bowel obstruction, likely partial. Moderate to large hiatal hernia. Admitted to surgery for monitoring of LBO.    PLAN:  - Pt was seen, examined and discussed with colorectal surgery fellow on behalf of Dr. Casas  - Admit to surgery  - Will consult GI for flexible sig (Dr. Davila consulted)  - NPO  - IVF  - enemas  - Med rec in the AM    Yaron Esquivel, PGY-2  [facility]  [id number]

## 2022-05-06 NOTE — H&P ADULT - ATTENDING COMMENTS
Large bowel obstruction of unclear etiology  -NPO  -GI eval for flex sig/stent  -dvt ppx  -OOB  -enemas this am

## 2022-05-06 NOTE — ED PROVIDER NOTE - PROGRESS NOTE DETAILS
Ana Sal MD PGY1: CT scan shows large bowel obstruction. Surgery consulted. They will see patient at bedside. Patient GI doctor is Lio Davila Ana Sal MD PGY1: Patient seen by surgery and is to be admitted for Large SUSANNAH. Received abx for possible diverticulitis.

## 2022-05-07 LAB
ANION GAP SERPL CALC-SCNC: 13 MMOL/L — SIGNIFICANT CHANGE UP (ref 5–17)
APTT BLD: 24.7 SEC — LOW (ref 27.5–35.5)
BUN SERPL-MCNC: 13 MG/DL — SIGNIFICANT CHANGE UP (ref 7–23)
CALCIUM SERPL-MCNC: 8.5 MG/DL — SIGNIFICANT CHANGE UP (ref 8.4–10.5)
CHLORIDE SERPL-SCNC: 105 MMOL/L — SIGNIFICANT CHANGE UP (ref 96–108)
CO2 SERPL-SCNC: 22 MMOL/L — SIGNIFICANT CHANGE UP (ref 22–31)
CREAT SERPL-MCNC: 0.75 MG/DL — SIGNIFICANT CHANGE UP (ref 0.5–1.3)
CULTURE RESULTS: SIGNIFICANT CHANGE UP
EGFR: 81 ML/MIN/1.73M2 — SIGNIFICANT CHANGE UP
GLUCOSE SERPL-MCNC: 74 MG/DL — SIGNIFICANT CHANGE UP (ref 70–99)
HCT VFR BLD CALC: 30.2 % — LOW (ref 34.5–45)
HGB BLD-MCNC: 9.2 G/DL — LOW (ref 11.5–15.5)
INR BLD: 1.11 RATIO — SIGNIFICANT CHANGE UP (ref 0.88–1.16)
MAGNESIUM SERPL-MCNC: 2.1 MG/DL — SIGNIFICANT CHANGE UP (ref 1.6–2.6)
MCHC RBC-ENTMCNC: 26.7 PG — LOW (ref 27–34)
MCHC RBC-ENTMCNC: 30.5 GM/DL — LOW (ref 32–36)
MCV RBC AUTO: 87.8 FL — SIGNIFICANT CHANGE UP (ref 80–100)
NRBC # BLD: 0 /100 WBCS — SIGNIFICANT CHANGE UP (ref 0–0)
PHOSPHATE SERPL-MCNC: 1.9 MG/DL — LOW (ref 2.5–4.5)
PLATELET # BLD AUTO: 147 K/UL — LOW (ref 150–400)
POTASSIUM SERPL-MCNC: 3.7 MMOL/L — SIGNIFICANT CHANGE UP (ref 3.5–5.3)
POTASSIUM SERPL-SCNC: 3.7 MMOL/L — SIGNIFICANT CHANGE UP (ref 3.5–5.3)
PROTHROM AB SERPL-ACNC: 12.8 SEC — SIGNIFICANT CHANGE UP (ref 10.5–13.4)
RBC # BLD: 3.44 M/UL — LOW (ref 3.8–5.2)
RBC # FLD: 14.5 % — SIGNIFICANT CHANGE UP (ref 10.3–14.5)
SODIUM SERPL-SCNC: 140 MMOL/L — SIGNIFICANT CHANGE UP (ref 135–145)
SPECIMEN SOURCE: SIGNIFICANT CHANGE UP
WBC # BLD: 7.28 K/UL — SIGNIFICANT CHANGE UP (ref 3.8–10.5)
WBC # FLD AUTO: 7.28 K/UL — SIGNIFICANT CHANGE UP (ref 3.8–10.5)

## 2022-05-07 PROCEDURE — 99222 1ST HOSP IP/OBS MODERATE 55: CPT

## 2022-05-07 PROCEDURE — 99222 1ST HOSP IP/OBS MODERATE 55: CPT | Mod: GC

## 2022-05-07 RX ORDER — PIPERACILLIN AND TAZOBACTAM 4; .5 G/20ML; G/20ML
3.38 INJECTION, POWDER, LYOPHILIZED, FOR SOLUTION INTRAVENOUS EVERY 8 HOURS
Refills: 0 | Status: DISCONTINUED | OUTPATIENT
Start: 2022-05-07 | End: 2022-05-09

## 2022-05-07 RX ORDER — SENNA PLUS 8.6 MG/1
2 TABLET ORAL AT BEDTIME
Refills: 0 | Status: DISCONTINUED | OUTPATIENT
Start: 2022-05-07 | End: 2022-05-10

## 2022-05-07 RX ORDER — PIPERACILLIN AND TAZOBACTAM 4; .5 G/20ML; G/20ML
3.38 INJECTION, POWDER, LYOPHILIZED, FOR SOLUTION INTRAVENOUS ONCE
Refills: 0 | Status: COMPLETED | OUTPATIENT
Start: 2022-05-07 | End: 2022-05-07

## 2022-05-07 RX ORDER — POTASSIUM PHOSPHATE, MONOBASIC POTASSIUM PHOSPHATE, DIBASIC 236; 224 MG/ML; MG/ML
30 INJECTION, SOLUTION INTRAVENOUS ONCE
Refills: 0 | Status: COMPLETED | OUTPATIENT
Start: 2022-05-07 | End: 2022-05-07

## 2022-05-07 RX ORDER — CETIRIZINE HYDROCHLORIDE 10 MG/1
1 TABLET ORAL
Qty: 0 | Refills: 0 | DISCHARGE

## 2022-05-07 RX ORDER — KETOTIFEN FUMARATE 0.34 MG/ML
1 SOLUTION OPHTHALMIC EVERY 8 HOURS
Refills: 0 | Status: DISCONTINUED | OUTPATIENT
Start: 2022-05-07 | End: 2022-05-10

## 2022-05-07 RX ORDER — ATORVASTATIN CALCIUM 80 MG/1
10 TABLET, FILM COATED ORAL AT BEDTIME
Refills: 0 | Status: DISCONTINUED | OUTPATIENT
Start: 2022-05-07 | End: 2022-05-10

## 2022-05-07 RX ORDER — ATORVASTATIN CALCIUM 80 MG/1
1 TABLET, FILM COATED ORAL
Qty: 0 | Refills: 0 | DISCHARGE

## 2022-05-07 RX ORDER — DEXTROSE MONOHYDRATE, SODIUM CHLORIDE, AND POTASSIUM CHLORIDE 50; .745; 4.5 G/1000ML; G/1000ML; G/1000ML
1000 INJECTION, SOLUTION INTRAVENOUS
Refills: 0 | Status: DISCONTINUED | OUTPATIENT
Start: 2022-05-07 | End: 2022-05-10

## 2022-05-07 RX ORDER — KETOTIFEN FUMARATE 0.34 MG/ML
1 SOLUTION OPHTHALMIC
Qty: 0 | Refills: 0 | DISCHARGE

## 2022-05-07 RX ORDER — POLYETHYLENE GLYCOL 3350 17 G/17G
17 POWDER, FOR SOLUTION ORAL EVERY 12 HOURS
Refills: 0 | Status: DISCONTINUED | OUTPATIENT
Start: 2022-05-07 | End: 2022-05-07

## 2022-05-07 RX ORDER — CYCLOSPORINE 0.5 MG/ML
1 EMULSION OPHTHALMIC
Qty: 0 | Refills: 0 | DISCHARGE

## 2022-05-07 RX ADMIN — KETOTIFEN FUMARATE 1 DROP(S): 0.34 SOLUTION OPHTHALMIC at 21:28

## 2022-05-07 RX ADMIN — KETOTIFEN FUMARATE 1 DROP(S): 0.34 SOLUTION OPHTHALMIC at 17:24

## 2022-05-07 RX ADMIN — PIPERACILLIN AND TAZOBACTAM 25 GRAM(S): 4; .5 INJECTION, POWDER, LYOPHILIZED, FOR SOLUTION INTRAVENOUS at 21:29

## 2022-05-07 RX ADMIN — ATORVASTATIN CALCIUM 10 MILLIGRAM(S): 80 TABLET, FILM COATED ORAL at 21:28

## 2022-05-07 RX ADMIN — Medication 1 ENEMA: at 12:12

## 2022-05-07 RX ADMIN — ENOXAPARIN SODIUM 40 MILLIGRAM(S): 100 INJECTION SUBCUTANEOUS at 06:12

## 2022-05-07 RX ADMIN — DEXTROSE MONOHYDRATE, SODIUM CHLORIDE, AND POTASSIUM CHLORIDE 75 MILLILITER(S): 50; .745; 4.5 INJECTION, SOLUTION INTRAVENOUS at 08:47

## 2022-05-07 RX ADMIN — POTASSIUM PHOSPHATE, MONOBASIC POTASSIUM PHOSPHATE, DIBASIC 83.33 MILLIMOLE(S): 236; 224 INJECTION, SOLUTION INTRAVENOUS at 09:52

## 2022-05-07 RX ADMIN — SENNA PLUS 2 TABLET(S): 8.6 TABLET ORAL at 21:28

## 2022-05-07 RX ADMIN — PIPERACILLIN AND TAZOBACTAM 200 GRAM(S): 4; .5 INJECTION, POWDER, LYOPHILIZED, FOR SOLUTION INTRAVENOUS at 06:12

## 2022-05-07 RX ADMIN — Medication 1 ENEMA: at 17:24

## 2022-05-07 RX ADMIN — Medication 1 DROP(S): at 13:01

## 2022-05-07 RX ADMIN — Medication 1 DROP(S): at 17:24

## 2022-05-07 RX ADMIN — PIPERACILLIN AND TAZOBACTAM 25 GRAM(S): 4; .5 INJECTION, POWDER, LYOPHILIZED, FOR SOLUTION INTRAVENOUS at 14:58

## 2022-05-07 NOTE — CONSULT NOTE ADULT - ASSESSMENT
# LBO, partial - last BM was Wednesday, small. Passing gas and abdomen soft, NT, but distended. Unclear etiology, but ddx including malignancy vs stercoral colitis in setting of constipation vs stricture    Recommendations:  - Emergent endoscopic intervention is not indicated  - If unable to perform barium enema, give regular enemas and monitor BMs  - Will continue to monitor  - Tentatively plan for Flex sig vs colonoscopy +/- stent on Monday pending patient's condition, sooner if indicated  - Daily CBC, CMP, coags  - Keep NPO  - Serial abdominal exams  - Ensure adequate hydration  - Rest of care per primary    Thank you for involving us in this patient's care.    Preliminary note until signed by Attending.    Nikole Childs MD  Gastroenterology/Hepatology Fellow, PGY-V  Weekend Coverage    NON-URGENT CONSULTS:  Please email [email or web address]  AT NIGHT AND ON WEEKENDS:  Contact on-call GI fellow via answering service ([phone]) from 5pm-8am and on weekends/holidays  MONDAY-FRIDAY 8AM-5PM:  Pager# [phone] ([facility])  GI Phone# [phone] ([facility])

## 2022-05-07 NOTE — PROGRESS NOTE ADULT - ASSESSMENT
76F with a PMHx of osteoporosis, HLD, lumpectomy who presents with lower abdominal pain that started on Wednesday. Pt states she has been passing gas but no BM since Wednesday. Otherwise denies fever/chills, CP, SOB or vomiting. Pt states she follows with Dr. Davila of GI and last had a colonoscopy 5 years ago. Also admits to a family hx of colon cancer. CT scan was done and showed Wall thickening in the distal sigmoid colon, possibly due to colitis or diverticulitis given the presence of sigmoid diverticula. However, wall thickening is somewhat irregular, raising suspicion for neoplasm. Suggest correlation with colonoscopy. Mild distention of the sigmoid and descending colon upstream to the site of sigmoid thickening, concerning for large bowel obstruction, likely partial. Moderate to large hiatal hernia. Admitted to surgery for monitoring of LBO.    PLAN:  - Pt was seen, examined and discussed with colorectal surgery fellow on behalf of Dr. Casas  - Admit to surgery  - Will consult GI for flexible sig (Dr. Davila consulted)  - NPO  - IVF  - enemas  - Med rec in the AM      [facility]  [id number]   76F with a PMHx of osteoporosis, HLD, lumpectomy who presents with lower abdominal pain that started on Wednesday. Pt states she has been passing gas but no BM since Wednesday. Otherwise denies fever/chills, CP, SOB or vomiting. Pt states she follows with Dr. Davila of GI and last had a colonoscopy 5 years ago. Also admits to a family hx of colon cancer. CT scan was done and showed Wall thickening in the distal sigmoid colon, possibly due to colitis or diverticulitis given the presence of sigmoid diverticula. However, wall thickening is somewhat irregular, raising suspicion for neoplasm. Suggest correlation with colonoscopy. Mild distention of the sigmoid and descending colon upstream to the site of sigmoid thickening, concerning for large bowel obstruction, likely partial. Moderate to large hiatal hernia. Admitted to surgery for monitoring of LBO.    PLAN:  - NPO, except meds   - bowel reg  - IVF  - enemas  - zosyn  - eye drops   - GI consult     [facility]  [id number]

## 2022-05-07 NOTE — CONSULT NOTE ADULT - SUBJECTIVE AND OBJECTIVE BOX
Chief Complaint:  Patient is a 76y old  Female who presents with a chief complaint of LBO (07 May 2022 09:36)      HPI:SHYAM KELLEY is a 76F with a PMHx of osteoporosis, HLD, h/o breast cancer lumpectomy who presented with lower abdominal pain that started on Wednesday. Pt states she has been passing gas but no BM since Wednesday; she attempted an enema at home, but was not able to insert it well and only had very small amount of brown stool follow. Otherwise denies fever/chills, CP, SOB or vomiting. Pt states she follows with Dr. Davila of GI and last had a colonoscopy 5 years ago; says the doctor saw something but was told it was not worrisome. Her father had colon cancer. CT scan was done and showed Wall thickening in the distal sigmoid colon, possibly due to colitis or diverticulitis given the presence of sigmoid diverticula. However, wall thickening is somewhat irregular, raising suspicion for neoplasm.  Mild distention of the sigmoid and descending colon upstream to the site of sigmoid thickening, concerning for large bowel obstruction, likely partial. Moderate to large hiatal hernia and a significant stool burden. She was admitted to surgery for monitoring of LBO and [facility] was consulted for possible stent. She has been HDS, afebrile but had chills, w/o abdominal pain and significant distension, but complains of bloating. Passing a little gas today and is NPO.    PMHX/PSHX:  Osteoporosis    Allergic Rhinitis    Ductal Carcinoma in Situ of Breast      Allergies:  Fosamax (Unknown)      Home Medications: reviewed  Hospital Medications:  artificial  tears Solution 1 Drop(s) Both EYES four times a day  atorvastatin 10 milliGRAM(s) Oral at bedtime  dextrose 5% + sodium chloride 0.45% with potassium chloride 20 mEq/L 1000 milliLiter(s) IV Continuous <Continuous>  enoxaparin Injectable 40 milliGRAM(s) SubCutaneous every 24 hours  ketotifen 0.025% Ophthalmic Solution 1 Drop(s) Both EYES every 8 hours  piperacillin/tazobactam IVPB.. 3.375 Gram(s) IV Intermittent every 8 hours  senna 2 Tablet(s) Oral at bedtime      Social History:   Tob: Denies  EtOH: Denies  Illicit Drugs: Denies    Family history:    father - colon cancer    ROS:   Complete and normal except as mentioned above.    PHYSICAL EXAM:   Vital Signs:  Vital Signs Last 24 Hrs  T(C): 36.8 (07 May 2022 18:47), Max: 36.9 (07 May 2022 13:48)  T(F): 98.3 (07 May 2022 18:47), Max: 98.5 (07 May 2022 13:48)  HR: 88 (07 May 2022 18:47) (79 - 90)  BP: 125/71 (07 May 2022 18:47) (116/75 - 134/74)  BP(mean): --  RR: 18 (07 May 2022 18:47) (18 - 18)  SpO2: 95% (07 May 2022 18:47) (94% - 98%)  Daily     Daily     GENERAL: no acute distress  NEURO: alert  HEENT: anicteric sclera, no conjunctival pallor appreciated  CHEST: no respiratory distress, no accessory muscle use  CARDIAC: regular rate   ABDOMEN: soft, non-tender, mildly distended, no rebound or guarding  EXTREMITIES: warm, well perfused, no edema  SKIN: no lesions noted    LABS: reviewed                        9.2    7.28  )-----------( 147      ( 07 May 2022 07:34 )             30.2     05-07    140  |  105  |  13  ----------------------------<  74  3.7   |  22  |  0.75    Ca    8.5      07 May 2022 07:44  Phos  1.9     05-07  Mg     2.1     05-07    TPro  7.4  /  Alb  4.6  /  TBili  0.4  /  DBili  x   /  AST  22  /  ALT  20  /  AlkPhos  75  05-06    LIVER FUNCTIONS - ( 06 May 2022 13:54 )  Alb: 4.6 g/dL / Pro: 7.4 g/dL / ALK PHOS: 75 U/L / ALT: 20 U/L / AST: 22 U/L / GGT: x             Culture - Urine (collected 06 May 2022 13:53)  Source: Clean Catch Clean Catch (Midstream)  Final Report (07 May 2022 12:44):    <10,000 CFU/mL Normal Urogenital Claudette        Diagnostic Studies: see sunrise for full report

## 2022-05-07 NOTE — CONSULT NOTE ADULT - ATTENDING COMMENTS
As above  76F with osteoporosis, hyperlipidemia, breast ca s/p lumpectomy, chronic constipation  Father with colon ca, also breast ca in paternal fam, her last colon 5 years ago was normal per report  Here with partial LBO - no stools since Wed but + flatus, abdomen mildly distended but soft, CT with sigmoid inflammation with proximal stool- filled colon - this could be partial obstruction with stool ball, benign vs malignant stricture  Requested hypaque enema for possible disimpaction + better evaluation of stricture if present - was not able to be done by radiology  Regular enemas - eval for stool passage  Serial abdominal exams  Likely flex sig +/- stent as needed on Monday unless stops passing flatus/abdomen becomes significantly distended this weekend    Thank you for this interesting consult.  Please call the advanced GI service with any questions or concerns.

## 2022-05-07 NOTE — PROGRESS NOTE ADULT - SUBJECTIVE AND OBJECTIVE BOX
Surgery Progress Note     Subjective/24hour Events:   Patient seen and examined.         Vital Signs:  Vital Signs Last 24 Hrs  T(C): 36.8 (06 May 2022 22:35), Max: 36.8 (06 May 2022 13:46)  T(F): 98.2 (06 May 2022 22:35), Max: 98.3 (06 May 2022 13:46)  HR: 79 (06 May 2022 22:35) (79 - 99)  BP: 116/75 (06 May 2022 22:35) (116/74 - 140/71)  BP(mean): --  RR: 18 (06 May 2022 22:35) (18 - 19)  SpO2: 96% (06 May 2022 22:35) (96% - 99%)    CAPILLARY BLOOD GLUCOSE          I&O's Detail      MEDICATIONS  (STANDING):  enoxaparin Injectable 40 milliGRAM(s) SubCutaneous every 24 hours  lactated ringers. 1000 milliLiter(s) (75 mL/Hr) IV Continuous <Continuous>    MEDICATIONS  (PRN):      Physical Exam:  General: WN/WD NAD  Neurology: Awake, nonfocal, SELLERS x 4  Respiratory: non labored breath sounds on RA  CV: RRR, S1S2, no murmurs, rubs or gallops  Abdominal: Soft, LLQ TTP, ND  Psych: Oriented x 3, normal affect        Labs:    05-06    137  |  100  |  17  ----------------------------<  113<H>  3.6   |  21<L>  |  0.86    Ca    9.8      06 May 2022 13:54    TPro  7.4  /  Alb  4.6  /  TBili  0.4  /  DBili  x   /  AST  22  /  ALT  20  /  AlkPhos  75  05-06    LIVER FUNCTIONS - ( 06 May 2022 13:54 )  Alb: 4.6 g/dL / Pro: 7.4 g/dL / ALK PHOS: 75 U/L / ALT: 20 U/L / AST: 22 U/L / GGT: x                                 11.4   13.70 )-----------( 185      ( 06 May 2022 13:54 )             36.2     PT/INR - ( 06 May 2022 14:33 )   PT: 13.1 sec;   INR: 1.14 ratio         PTT - ( 06 May 2022 14:33 )  PTT:25.5 sec     Surgery Progress Note     Subjective/24hour Events:   Patient seen and examined. no acute events overnight. no flatus or bowel movement.         Vital Signs:  Vital Signs Last 24 Hrs  T(C): 36.8 (06 May 2022 22:35), Max: 36.8 (06 May 2022 13:46)  T(F): 98.2 (06 May 2022 22:35), Max: 98.3 (06 May 2022 13:46)  HR: 79 (06 May 2022 22:35) (79 - 99)  BP: 116/75 (06 May 2022 22:35) (116/74 - 140/71)  BP(mean): --  RR: 18 (06 May 2022 22:35) (18 - 19)  SpO2: 96% (06 May 2022 22:35) (96% - 99%)    CAPILLARY BLOOD GLUCOSE          I&O's Detail      MEDICATIONS  (STANDING):  enoxaparin Injectable 40 milliGRAM(s) SubCutaneous every 24 hours  lactated ringers. 1000 milliLiter(s) (75 mL/Hr) IV Continuous <Continuous>    MEDICATIONS  (PRN):      Physical Exam:  General: WN/WD NAD  Neurology: Awake, nonfocal, SELLERS x 4  Respiratory: non labored breath sounds on RA  Abdominal: Soft, LLQ TTP, ND  Psych: Oriented x 3, normal affect        Labs:    05-06    137  |  100  |  17  ----------------------------<  113<H>  3.6   |  21<L>  |  0.86    Ca    9.8      06 May 2022 13:54    TPro  7.4  /  Alb  4.6  /  TBili  0.4  /  DBili  x   /  AST  22  /  ALT  20  /  AlkPhos  75  05-06    LIVER FUNCTIONS - ( 06 May 2022 13:54 )  Alb: 4.6 g/dL / Pro: 7.4 g/dL / ALK PHOS: 75 U/L / ALT: 20 U/L / AST: 22 U/L / GGT: x                                 11.4   13.70 )-----------( 185      ( 06 May 2022 13:54 )             36.2     PT/INR - ( 06 May 2022 14:33 )   PT: 13.1 sec;   INR: 1.14 ratio         PTT - ( 06 May 2022 14:33 )  PTT:25.5 sec

## 2022-05-07 NOTE — PATIENT PROFILE ADULT - FALL HARM RISK - UNIVERSAL INTERVENTIONS
Bed in lowest position, wheels locked, appropriate side rails in place/Call bell, personal items and telephone in reach/Instruct patient to call for assistance before getting out of bed or chair/Non-slip footwear when patient is out of bed/[place] to call system/Physically safe environment - no spills, clutter or unnecessary equipment/Purposeful Proactive Rounding/Room/bathroom lighting operational, light cord in reach

## 2022-05-07 NOTE — PATIENT PROFILE ADULT - FUNCTIONAL ASSESSMENT - DAILY ACTIVITY 6.
Physical Therapy Daily Treatment      Visit Count: 3 of 12 (visits)   Authorization Status: APPROVING 12 PHYSICAL THERAPY VISITS 02/08/2017 THRU 03/08/2017  20 visit max per calender year  Next Referring Provider Visit: 3-6-17    Initial Evaluation Date: 2-8-17  Referred by: Dr. 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  She has less pain overall.  Current Pain: 0/10.    Functional Change: Pt state she was able to walk down the steps like a \"normal person\". It still hurt but that was the 1st time in months.    OBJECTIVE   Range of Motion (degrees): Ankle Active Range of Motion  [] All motions within functional/normal limits except those noted.   [x] Only those motions that were assessed are noted.   [] Uninvolved motion within functional/normal limits.    Norm Left Right Involved   Date   Initial Initial 2/17/17    Dorsiflexion 20° 3 6 Pre TX: 6 JJ   Post TX: 10L   Plantar Flexion 50°         Inversion 35°         Eversion 15°         First MTP Dorsiflexion 60°         First MTP Plantar Flexion 45°         [standard testing positions unless otherwise noted; MTP=metatarsophalangeal joint]   Comments:* denotes pain    Joint Mobility:  Normal TCJ mobility JJ with springy end feel    Treatment   Therapeutic Exercise: 15 minutes  Exercise 2-14-17 2/17/17 Position/Cues   quad stretch Prone, towel roll under left quad  Standing dorsum foot on chair seat     clam Vs red TB     Resisted gait Vs red Tb placed at mid foot x10ft    Squats  x5 at // bars  x5 with TRX Cues to shift weight posteriorly and  maintain proper alignment   Split squats  x5 with TRX Cues for alignment                           Comments:       Manual Therapy: 25 minutes  · Passive hamstring stretch JJ with contract relax x3 JJ  · IASTM with gua sha tools to left gastrocs (concentrating on medial head) followed by 90 second DF stretch with strap  · Applied rock tape to inhibit her hamstring on the left.     Home Exercise Program:  Exercise: Date issued Date DC Comments   Long sitting ankle DF and hamstring stretch with strap 2/8/17        resisted lateral gait and sidelie clam vs red TB 2-14-17                                           ASSESSMENT   Taylor describes feeling better already with the stretches she has been doing and with PT.  However, her DF ROM remains the same and she had increased audible cracking in her knee after manual intervention today.  She was painful with squats and lunges but was unable to perform them with good form likely creating additional stress to her knee.     Pain after treatment: 4/10  Result of above outlined education: Verbalizes understanding    Goals:  To be obtained by end of this plan of care:  4. Patient independent with modified and progressed home exercise program.  5. Patient will decrease involved knee pain to 0/10 to aid in squatting for lifting for household independent living.   6. Patient will increase involved ankle active range of motion to 15° to aid in ambulating on level and unlevel surfaces.   7. Patient will increase involved knee strength to 5/5 to aid in community ambulation for activities of independent living.  5. Lower Extremity Functional Scale: Patient will complete form to reflect an improved score from initial score of 20 to greater than or equal to 60 (0=extreme difficulty; 80=no difficulty) to indicate pt reported improvement in function/disability/impairment (minimal detectable change: 9 points).     PLAN   Assess response to intervention today.  Continue with manual  intervention and progression of hip strength to gain control during functional movement like squats and lunges.    Skilled training and instruction for the following interventions:  Manual Therapy (05765)  Neuromuscular Re-Education (13967)  Therapeutic Activity (25754)  Therapeutic Exercise (33310)  Ultrasound/Phonophoresis (37826)    THERAPY DAILY BILLING   Primary Insurance: [employer] [facility] [employer]  Secondary Insurance: N/A    Evaluation Procedures:  No evaluation codes were used on this date of service    Timed Procedures:  Manual Therapy, 25 minutes  Therapeutic Exercise, 15 minutes    Untimed Procedures:  No untimed codes were used on this date of service    Total Treatment Time: 40 minutes       4 = No assist / stand by assistance

## 2022-05-08 LAB
ANION GAP SERPL CALC-SCNC: 11 MMOL/L — SIGNIFICANT CHANGE UP (ref 5–17)
BUN SERPL-MCNC: 8 MG/DL — SIGNIFICANT CHANGE UP (ref 7–23)
CALCIUM SERPL-MCNC: 8.3 MG/DL — LOW (ref 8.4–10.5)
CHLORIDE SERPL-SCNC: 107 MMOL/L — SIGNIFICANT CHANGE UP (ref 96–108)
CO2 SERPL-SCNC: 21 MMOL/L — LOW (ref 22–31)
CREAT SERPL-MCNC: 0.71 MG/DL — SIGNIFICANT CHANGE UP (ref 0.5–1.3)
EGFR: 87 ML/MIN/1.73M2 — SIGNIFICANT CHANGE UP
GLUCOSE SERPL-MCNC: 99 MG/DL — SIGNIFICANT CHANGE UP (ref 70–99)
HCT VFR BLD CALC: 29.7 % — LOW (ref 34.5–45)
HGB BLD-MCNC: 9.1 G/DL — LOW (ref 11.5–15.5)
MAGNESIUM SERPL-MCNC: 2 MG/DL — SIGNIFICANT CHANGE UP (ref 1.6–2.6)
MCHC RBC-ENTMCNC: 27.5 PG — SIGNIFICANT CHANGE UP (ref 27–34)
MCHC RBC-ENTMCNC: 30.6 GM/DL — LOW (ref 32–36)
MCV RBC AUTO: 89.7 FL — SIGNIFICANT CHANGE UP (ref 80–100)
NRBC # BLD: 0 /100 WBCS — SIGNIFICANT CHANGE UP (ref 0–0)
PHOSPHATE SERPL-MCNC: 2.2 MG/DL — LOW (ref 2.5–4.5)
PLATELET # BLD AUTO: 142 K/UL — LOW (ref 150–400)
POTASSIUM SERPL-MCNC: 4.2 MMOL/L — SIGNIFICANT CHANGE UP (ref 3.5–5.3)
POTASSIUM SERPL-SCNC: 4.2 MMOL/L — SIGNIFICANT CHANGE UP (ref 3.5–5.3)
RBC # BLD: 3.31 M/UL — LOW (ref 3.8–5.2)
RBC # FLD: 14.3 % — SIGNIFICANT CHANGE UP (ref 10.3–14.5)
SODIUM SERPL-SCNC: 139 MMOL/L — SIGNIFICANT CHANGE UP (ref 135–145)
WBC # BLD: 6.04 K/UL — SIGNIFICANT CHANGE UP (ref 3.8–10.5)
WBC # FLD AUTO: 6.04 K/UL — SIGNIFICANT CHANGE UP (ref 3.8–10.5)

## 2022-05-08 PROCEDURE — 99231 SBSQ HOSP IP/OBS SF/LOW 25: CPT

## 2022-05-08 PROCEDURE — 99233 SBSQ HOSP IP/OBS HIGH 50: CPT | Mod: GC

## 2022-05-08 RX ORDER — POTASSIUM PHOSPHATE, MONOBASIC POTASSIUM PHOSPHATE, DIBASIC 236; 224 MG/ML; MG/ML
30 INJECTION, SOLUTION INTRAVENOUS ONCE
Refills: 0 | Status: COMPLETED | OUTPATIENT
Start: 2022-05-08 | End: 2022-05-08

## 2022-05-08 RX ORDER — GLYCERIN ADULT
1 SUPPOSITORY, RECTAL RECTAL ONCE
Refills: 0 | Status: COMPLETED | OUTPATIENT
Start: 2022-05-08 | End: 2022-05-08

## 2022-05-08 RX ORDER — MINERAL OIL
133 OIL (ML) MISCELLANEOUS ONCE
Refills: 0 | Status: COMPLETED | OUTPATIENT
Start: 2022-05-08 | End: 2022-05-08

## 2022-05-08 RX ADMIN — PIPERACILLIN AND TAZOBACTAM 25 GRAM(S): 4; .5 INJECTION, POWDER, LYOPHILIZED, FOR SOLUTION INTRAVENOUS at 22:27

## 2022-05-08 RX ADMIN — Medication 1 DROP(S): at 12:34

## 2022-05-08 RX ADMIN — PIPERACILLIN AND TAZOBACTAM 25 GRAM(S): 4; .5 INJECTION, POWDER, LYOPHILIZED, FOR SOLUTION INTRAVENOUS at 06:03

## 2022-05-08 RX ADMIN — KETOTIFEN FUMARATE 1 DROP(S): 0.34 SOLUTION OPHTHALMIC at 06:13

## 2022-05-08 RX ADMIN — DEXTROSE MONOHYDRATE, SODIUM CHLORIDE, AND POTASSIUM CHLORIDE 75 MILLILITER(S): 50; .745; 4.5 INJECTION, SOLUTION INTRAVENOUS at 17:28

## 2022-05-08 RX ADMIN — KETOTIFEN FUMARATE 1 DROP(S): 0.34 SOLUTION OPHTHALMIC at 22:28

## 2022-05-08 RX ADMIN — Medication 1 DROP(S): at 17:00

## 2022-05-08 RX ADMIN — Medication 1 DROP(S): at 00:54

## 2022-05-08 RX ADMIN — ATORVASTATIN CALCIUM 10 MILLIGRAM(S): 80 TABLET, FILM COATED ORAL at 22:27

## 2022-05-08 RX ADMIN — POTASSIUM PHOSPHATE, MONOBASIC POTASSIUM PHOSPHATE, DIBASIC 83.33 MILLIMOLE(S): 236; 224 INJECTION, SOLUTION INTRAVENOUS at 11:53

## 2022-05-08 RX ADMIN — Medication 1 ENEMA: at 11:52

## 2022-05-08 RX ADMIN — Medication 133 MILLILITER(S): at 15:00

## 2022-05-08 RX ADMIN — PIPERACILLIN AND TAZOBACTAM 25 GRAM(S): 4; .5 INJECTION, POWDER, LYOPHILIZED, FOR SOLUTION INTRAVENOUS at 17:24

## 2022-05-08 RX ADMIN — ENOXAPARIN SODIUM 40 MILLIGRAM(S): 100 INJECTION SUBCUTANEOUS at 06:03

## 2022-05-08 RX ADMIN — Medication 1 DROP(S): at 06:13

## 2022-05-08 RX ADMIN — DEXTROSE MONOHYDRATE, SODIUM CHLORIDE, AND POTASSIUM CHLORIDE 75 MILLILITER(S): 50; .745; 4.5 INJECTION, SOLUTION INTRAVENOUS at 22:28

## 2022-05-08 RX ADMIN — SENNA PLUS 2 TABLET(S): 8.6 TABLET ORAL at 22:27

## 2022-05-08 RX ADMIN — Medication 1 SUPPOSITORY(S): at 14:00

## 2022-05-08 NOTE — PROGRESS NOTE ADULT - ASSESSMENT
# LBO, partial - last BM was Wednesday, small. Passing gas and abdomen soft, NT, but distended. Unclear etiology, but ddx including malignancy vs stercoral colitis in setting of constipation vs stricture    Recommendations:  - Tentatively plan for Flex sig +/- stent placement tomorrow  - Give two enemas in early AM  - Keep NPO  - Check early AM CBC, CMP, Coags, correct electrolyte disturbances  - Transfuse if hgb < 7  - Will continue to monitor  - Serial abdominal exams  - Ensure adequate hydration  - Rest of care per primary    Thank you for involving us in this patient's care.    Preliminary note until signed by Attending.    Nikole Childs MD  Gastroenterology/Hepatology Fellow, PGY-V  Weekend Coverage    NON-URGENT CONSULTS:  Please email [email or web address]  AT NIGHT AND ON WEEKENDS:  Contact on-call GI fellow via answering service ([phone]) from 5pm-8am and on weekends/holidays  MONDAY-FRIDAY 8AM-5PM:  Pager# [phone] ([facility])  GI Phone# [phone] ([facility])

## 2022-05-08 NOTE — PROGRESS NOTE ADULT - SUBJECTIVE AND OBJECTIVE BOX
SUBJECTIVE:   Seen and examined at bedside during AM rounds. No acute events overnight. Received enemas with 2 small bowel movements. no nausea or vomiting.    OBJECTIVE: T(C): 36.5 (22 @ 01:23), Max: 36.9 (22 @ 13:48)  HR: 80 (22 @ 01:23) (80 - 90)  BP: 144/70 (22 01:23) (117/66 - 144/70)  RR: 18 (22:23) (18 - 18)  SpO2: 98% (22 @ 01:23) (94% - 98%)  Wt(kg): --  I&O's Summary    06 May 2022 07:01  -  07 May 2022 07:00  --------------------------------------------------------  IN: 375 mL / OUT: 450 mL / NET: -75 mL    07 May 2022 07:01  -  08 May 2022 01:30  --------------------------------------------------------  IN: 100 mL / OUT: 800 mL / NET: -700 mL      I&O's Detail    06 May 2022 07:01  -  07 May 2022 07:00  --------------------------------------------------------  IN:    Lactated Ringers: 375 mL  Total IN: 375 mL    OUT:    Voided (mL): 450 mL  Total OUT: 450 mL    Total NET: -75 mL      07 May 2022 07:01  -  08 May 2022 01:30  --------------------------------------------------------  IN:    IV PiggyBack: 100 mL  Total IN: 100 mL    OUT:    Oral Fluid: 0 mL    Voided (mL): 800 mL  Total OUT: 800 mL    Total NET: -700 mL      Physical Exam:  General: NAD  Resp: nonlabored   Abdomen: soft, minimally tender, minimally distended  Vasc: WWP    MEDICATIONS  (STANDING):  artificial  tears Solution 1 Drop(s) Both EYES four times a day  atorvastatin 10 milliGRAM(s) Oral at bedtime  dextrose 5% + sodium chloride 0.45% with potassium chloride 20 mEq/L 1000 milliLiter(s) (75 mL/Hr) IV Continuous <Continuous>  enoxaparin Injectable 40 milliGRAM(s) SubCutaneous every 24 hours  ketotifen 0.025% Ophthalmic Solution 1 Drop(s) Both EYES every 8 hours  piperacillin/tazobactam IVPB.. 3.375 Gram(s) IV Intermittent every 8 hours  senna 2 Tablet(s) Oral at bedtime    MEDICATIONS  (PRN):      LABS:                        9.2    7.28  )-----------( 147      ( 07 May 2022 07:34 )             30.2     05-07    140  |  105  |  13  ----------------------------<  74  3.7   |  22  |  0.75    Ca    8.5      07 May 2022 07:44  Phos  1.9     05-07  Mg     2.1     05-07    TPro  7.4  /  Alb  4.6  /  TBili  0.4  /  DBili  x   /  AST  22  /  ALT  20  /  AlkPhos  75  05-06    PT/INR - ( 07 May 2022 07:34 )   PT: 12.8 sec;   INR: 1.11 ratio         PTT - ( 07 May 2022 07:34 )  PTT:24.7 sec  Urinalysis Basic - ( 06 May 2022 13:54 )    Color: Yellow / Appearance: Clear / S.030 / pH: x  Gluc: x / Ketone: Moderate  / Bili: Small / Urobili: 3 mg/dL   Blood: x / Protein: 30 mg/dL / Nitrite: Negative   Leuk Esterase: Negative / RBC: 4 /hpf / WBC 5 /HPF   Sq Epi: x / Non Sq Epi: 4 /hpf / Bacteria: Negative

## 2022-05-08 NOTE — PROGRESS NOTE ADULT - ASSESSMENT
76F with a PMHx of osteoporosis, HLD, lumpectomy who presents with lower abdominal pain that started on Wednesday. Pt states she has been passing gas but no BM since Wednesday. Otherwise denies fever/chills, CP, SOB or vomiting. Pt states she follows with Dr. Davila of GI and last had a colonoscopy 5 years ago. Also admits to a family hx of colon cancer. CT scan was done and showed Wall thickening in the distal sigmoid colon, possibly due to colitis or diverticulitis given the presence of sigmoid diverticula. However, wall thickening is somewhat irregular, raising suspicion for neoplasm. Suggest correlation with colonoscopy. Mild distention of the sigmoid and descending colon upstream to the site of sigmoid thickening, concerning for large bowel obstruction, likely partial. Moderate to large hiatal hernia. Admitted to surgery for monitoring of LBO.    PLAN:  - NPO, except meds   - IVF  - enemas, possible barium enemas if possible to arrange w/ radiology  - appreciate GI recs, poss flex sig +/- stent   - zosyn for colonic wall thickening   - eye drops   - DVT ppx     Red Surgery  [id number]

## 2022-05-08 NOTE — PROGRESS NOTE ADULT - SUBJECTIVE AND OBJECTIVE BOX
Chief Complaint:  Patient is a 76y old  Female who presents with a chief complaint of LBO (08 May 2022 17:46)       Interval Events:   Had to small formed, brown BMs this AM s/p enema  Abd soft, mildly distended, LLQ tenderness  HDS afebrile    Hospital Medications:  artificial  tears Solution 1 Drop(s) Both EYES four times a day  atorvastatin 10 milliGRAM(s) Oral at bedtime  dextrose 5% + sodium chloride 0.45% with potassium chloride 20 mEq/L 1000 milliLiter(s) IV Continuous <Continuous>  enoxaparin Injectable 40 milliGRAM(s) SubCutaneous every 24 hours  ketotifen 0.025% Ophthalmic Solution 1 Drop(s) Both EYES every 8 hours  piperacillin/tazobactam IVPB.. 3.375 Gram(s) IV Intermittent every 8 hours  senna 2 Tablet(s) Oral at bedtime      ROS:   Complete and normal except as mentioned above.    PHYSICAL EXAM:   Vital Signs:  Vital Signs Last 24 Hrs  T(C): 36.7 (08 May 2022 18:08), Max: 37.1 (08 May 2022 04:29)  T(F): 98 (08 May 2022 18:08), Max: 98.7 (08 May 2022 04:29)  HR: 80 (08 May 2022 18:08) (80 - 82)  BP: 137/86 (08 May 2022 18:08) (111/57 - 144/70)  BP(mean): --  RR: 18 (08 May 2022 18:08) (18 - 18)  SpO2: 95% (08 May 2022 18:08) (94% - 98%)  Daily     Daily     GENERAL: no acute distress  NEURO: alert  HEENT: anicteric sclera, no conjunctival pallor appreciated  CHEST: no respiratory distress, no accessory muscle use  CARDIAC: regular rate, rhythm  ABDOMEN: soft, mild LLQ tenderness, mildly distended  EXTREMITIES: warm, well perfused, no edema  SKIN: no lesions noted    LABS: reviewed                        9.1    6.04  )-----------( 142      ( 08 May 2022 07:49 )             29.7     05-08    139  |  107  |  8   ----------------------------<  99  4.2   |  21<L>  |  0.71    Ca    8.3<L>      08 May 2022 07:36  Phos  2.2     05-08  Mg     2.0     05-08          Interval Diagnostic Studies: see sunrise for full report

## 2022-05-09 ENCOUNTER — APPOINTMENT (OUTPATIENT)
Dept: INTERNAL MEDICINE | Facility: CLINIC | Age: 77
End: 2022-05-09

## 2022-05-09 LAB
ANION GAP SERPL CALC-SCNC: 14 MMOL/L — SIGNIFICANT CHANGE UP (ref 5–17)
BUN SERPL-MCNC: 6 MG/DL — LOW (ref 7–23)
CALCIUM SERPL-MCNC: 8.9 MG/DL — SIGNIFICANT CHANGE UP (ref 8.4–10.5)
CHLORIDE SERPL-SCNC: 107 MMOL/L — SIGNIFICANT CHANGE UP (ref 96–108)
CO2 SERPL-SCNC: 19 MMOL/L — LOW (ref 22–31)
CREAT SERPL-MCNC: 0.69 MG/DL — SIGNIFICANT CHANGE UP (ref 0.5–1.3)
EGFR: 89 ML/MIN/1.73M2 — SIGNIFICANT CHANGE UP
GLUCOSE SERPL-MCNC: 104 MG/DL — HIGH (ref 70–99)
HCT VFR BLD CALC: 31.4 % — LOW (ref 34.5–45)
HGB BLD-MCNC: 9.6 G/DL — LOW (ref 11.5–15.5)
MAGNESIUM SERPL-MCNC: 2 MG/DL — SIGNIFICANT CHANGE UP (ref 1.6–2.6)
MCHC RBC-ENTMCNC: 26.5 PG — LOW (ref 27–34)
MCHC RBC-ENTMCNC: 30.6 GM/DL — LOW (ref 32–36)
MCV RBC AUTO: 86.7 FL — SIGNIFICANT CHANGE UP (ref 80–100)
NRBC # BLD: 0 /100 WBCS — SIGNIFICANT CHANGE UP (ref 0–0)
PHOSPHATE SERPL-MCNC: 2.4 MG/DL — LOW (ref 2.5–4.5)
PLATELET # BLD AUTO: 165 K/UL — SIGNIFICANT CHANGE UP (ref 150–400)
POTASSIUM SERPL-MCNC: 4.2 MMOL/L — SIGNIFICANT CHANGE UP (ref 3.5–5.3)
POTASSIUM SERPL-SCNC: 4.2 MMOL/L — SIGNIFICANT CHANGE UP (ref 3.5–5.3)
RBC # BLD: 3.62 M/UL — LOW (ref 3.8–5.2)
RBC # FLD: 14.1 % — SIGNIFICANT CHANGE UP (ref 10.3–14.5)
SODIUM SERPL-SCNC: 140 MMOL/L — SIGNIFICANT CHANGE UP (ref 135–145)
WBC # BLD: 4.51 K/UL — SIGNIFICANT CHANGE UP (ref 3.8–10.5)
WBC # FLD AUTO: 4.51 K/UL — SIGNIFICANT CHANGE UP (ref 3.8–10.5)

## 2022-05-09 PROCEDURE — 45330 DIAGNOSTIC SIGMOIDOSCOPY: CPT

## 2022-05-09 DEVICE — AUTOTOME CANNULATING SPHINCTEROTOME RX 44 20MM: Type: IMPLANTABLE DEVICE | Status: FUNCTIONAL

## 2022-05-09 DEVICE — BLLN EXTRACT FUSION QUATRO 8.5 10 12 15MM: Type: IMPLANTABLE DEVICE | Status: FUNCTIONAL

## 2022-05-09 RX ORDER — POLYETHYLENE GLYCOL 3350 17 G/17G
17 POWDER, FOR SOLUTION ORAL AT BEDTIME
Refills: 0 | Status: DISCONTINUED | OUTPATIENT
Start: 2022-05-09 | End: 2022-05-10

## 2022-05-09 RX ORDER — ACETAMINOPHEN 500 MG
1000 TABLET ORAL ONCE
Refills: 0 | Status: COMPLETED | OUTPATIENT
Start: 2022-05-09 | End: 2022-05-09

## 2022-05-09 RX ORDER — BENZOCAINE AND MENTHOL 5; 1 G/100ML; G/100ML
1 LIQUID ORAL
Refills: 0 | Status: DISCONTINUED | OUTPATIENT
Start: 2022-05-09 | End: 2022-05-10

## 2022-05-09 RX ORDER — ONDANSETRON 8 MG/1
4 TABLET, FILM COATED ORAL
Refills: 0 | Status: DISCONTINUED | OUTPATIENT
Start: 2022-05-09 | End: 2022-05-10

## 2022-05-09 RX ORDER — PSYLLIUM SEED (WITH DEXTROSE)
1 POWDER (GRAM) ORAL AT BEDTIME
Refills: 0 | Status: DISCONTINUED | OUTPATIENT
Start: 2022-05-09 | End: 2022-05-10

## 2022-05-09 RX ORDER — SODIUM CHLORIDE 9 MG/ML
1000 INJECTION, SOLUTION INTRAVENOUS
Refills: 0 | Status: DISCONTINUED | OUTPATIENT
Start: 2022-05-09 | End: 2022-05-10

## 2022-05-09 RX ADMIN — Medication 83.33 MILLIMOLE(S): at 11:06

## 2022-05-09 RX ADMIN — PIPERACILLIN AND TAZOBACTAM 25 GRAM(S): 4; .5 INJECTION, POWDER, LYOPHILIZED, FOR SOLUTION INTRAVENOUS at 05:53

## 2022-05-09 RX ADMIN — KETOTIFEN FUMARATE 1 DROP(S): 0.34 SOLUTION OPHTHALMIC at 15:50

## 2022-05-09 RX ADMIN — Medication 1 DROP(S): at 23:21

## 2022-05-09 RX ADMIN — ONDANSETRON 4 MILLIGRAM(S): 8 TABLET, FILM COATED ORAL at 18:47

## 2022-05-09 RX ADMIN — Medication 1 PACKET(S): at 22:24

## 2022-05-09 RX ADMIN — BENZOCAINE AND MENTHOL 1 LOZENGE: 5; 1 LIQUID ORAL at 21:58

## 2022-05-09 RX ADMIN — DEXTROSE MONOHYDRATE, SODIUM CHLORIDE, AND POTASSIUM CHLORIDE 75 MILLILITER(S): 50; .745; 4.5 INJECTION, SOLUTION INTRAVENOUS at 05:55

## 2022-05-09 RX ADMIN — SENNA PLUS 2 TABLET(S): 8.6 TABLET ORAL at 21:58

## 2022-05-09 RX ADMIN — KETOTIFEN FUMARATE 1 DROP(S): 0.34 SOLUTION OPHTHALMIC at 05:54

## 2022-05-09 RX ADMIN — Medication 1000 MILLIGRAM(S): at 19:12

## 2022-05-09 RX ADMIN — Medication 1 DROP(S): at 05:54

## 2022-05-09 RX ADMIN — ATORVASTATIN CALCIUM 10 MILLIGRAM(S): 80 TABLET, FILM COATED ORAL at 21:58

## 2022-05-09 RX ADMIN — ENOXAPARIN SODIUM 40 MILLIGRAM(S): 100 INJECTION SUBCUTANEOUS at 05:54

## 2022-05-09 RX ADMIN — Medication 400 MILLIGRAM(S): at 18:50

## 2022-05-09 RX ADMIN — POLYETHYLENE GLYCOL 3350 17 GRAM(S): 17 POWDER, FOR SOLUTION ORAL at 21:58

## 2022-05-09 RX ADMIN — Medication 1 DROP(S): at 00:10

## 2022-05-09 RX ADMIN — KETOTIFEN FUMARATE 1 DROP(S): 0.34 SOLUTION OPHTHALMIC at 22:00

## 2022-05-09 RX ADMIN — Medication 1 DROP(S): at 12:25

## 2022-05-09 NOTE — PROGRESS NOTE ADULT - SUBJECTIVE AND OBJECTIVE BOX
Surgery Progress Note     Subjective/24hour Events:   Patient seen and examined.       Vital Signs:  Vital Signs Last 24 Hrs  T(C): 37.1 (08 May 2022 21:31), Max: 37.1 (08 May 2022 04:29)  T(F): 98.8 (08 May 2022 21:31), Max: 98.8 (08 May 2022 21:31)  HR: 85 (08 May 2022 21:31) (80 - 85)  BP: 122/75 (08 May 2022 21:31) (111/57 - 144/70)  BP(mean): --  RR: 18 (08 May 2022 21:31) (18 - 18)  SpO2: 96% (08 May 2022 21:31) (94% - 98%)    CAPILLARY BLOOD GLUCOSE          I&O's Detail    07 May 2022 07:01  -  08 May 2022 07:00  --------------------------------------------------------  IN:    dextrose 5% + sodium chloride 0.45% w/ Additives: 900 mL    IV PiggyBack: 200 mL  Total IN: 1100 mL    OUT:    Oral Fluid: 0 mL    Voided (mL): 1200 mL  Total OUT: 1200 mL    Total NET: -100 mL      08 May 2022 07:01  -  09 May 2022 00:37  --------------------------------------------------------  IN:    Oral Fluid: 480 mL  Total IN: 480 mL    OUT:    Voided (mL): 225 mL  Total OUT: 225 mL    Total NET: 255 mL          MEDICATIONS  (STANDING):  artificial  tears Solution 1 Drop(s) Both EYES four times a day  atorvastatin 10 milliGRAM(s) Oral at bedtime  dextrose 5% + sodium chloride 0.45% with potassium chloride 20 mEq/L 1000 milliLiter(s) (75 mL/Hr) IV Continuous <Continuous>  enoxaparin Injectable 40 milliGRAM(s) SubCutaneous every 24 hours  ketotifen 0.025% Ophthalmic Solution 1 Drop(s) Both EYES every 8 hours  piperacillin/tazobactam IVPB.. 3.375 Gram(s) IV Intermittent every 8 hours  senna 2 Tablet(s) Oral at bedtime    MEDICATIONS  (PRN):      Physical Exam:  General: NAD  Resp: nonlabored   Abdomen: soft, minimally tender, minimally distended  Vasc: WWP        Labs:    05-08    139  |  107  |  8   ----------------------------<  99  4.2   |  21<L>  |  0.71    Ca    8.3<L>      08 May 2022 07:36  Phos  2.2     05-08  Mg     2.0     05-08                              9.1    6.04  )-----------( 142      ( 08 May 2022 07:49 )             29.7     PT/INR - ( 07 May 2022 07:34 )   PT: 12.8 sec;   INR: 1.11 ratio         PTT - ( 07 May 2022 07:34 )  PTT:24.7 sec     Surgery Progress Note     Subjective/24hour Events:   No acute overnight events.  Pain controlled.  +flatus, no BM.  Denies N/V.      Vital Signs:  Vital Signs Last 24 Hrs  T(C): 37.1 (08 May 2022 21:31), Max: 37.1 (08 May 2022 04:29)  T(F): 98.8 (08 May 2022 21:31), Max: 98.8 (08 May 2022 21:31)  HR: 85 (08 May 2022 21:31) (80 - 85)  BP: 122/75 (08 May 2022 21:31) (111/57 - 144/70)  BP(mean): --  RR: 18 (08 May 2022 21:31) (18 - 18)  SpO2: 96% (08 May 2022 21:31) (94% - 98%)    CAPILLARY BLOOD GLUCOSE          I&O's Detail    07 May 2022 07:01  -  08 May 2022 07:00  --------------------------------------------------------  IN:    dextrose 5% + sodium chloride 0.45% w/ Additives: 900 mL    IV PiggyBack: 200 mL  Total IN: 1100 mL    OUT:    Oral Fluid: 0 mL    Voided (mL): 1200 mL  Total OUT: 1200 mL    Total NET: -100 mL      08 May 2022 07:01  -  09 May 2022 00:37  --------------------------------------------------------  IN:    Oral Fluid: 480 mL  Total IN: 480 mL    OUT:    Voided (mL): 225 mL  Total OUT: 225 mL    Total NET: 255 mL          MEDICATIONS  (STANDING):  artificial  tears Solution 1 Drop(s) Both EYES four times a day  atorvastatin 10 milliGRAM(s) Oral at bedtime  dextrose 5% + sodium chloride 0.45% with potassium chloride 20 mEq/L 1000 milliLiter(s) (75 mL/Hr) IV Continuous <Continuous>  enoxaparin Injectable 40 milliGRAM(s) SubCutaneous every 24 hours  ketotifen 0.025% Ophthalmic Solution 1 Drop(s) Both EYES every 8 hours  piperacillin/tazobactam IVPB.. 3.375 Gram(s) IV Intermittent every 8 hours  senna 2 Tablet(s) Oral at bedtime    MEDICATIONS  (PRN):      Physical Exam:  General: NAD  Resp: nonlabored   Abdomen: soft, minimally tender, minimally distended  Vas: [facility]        Labs:    05-08    139  |  107  |  8   ----------------------------<  99  4.2   |  21<L>  |  0.71    Ca    8.3<L>      08 May 2022 07:36  Phos  2.2     05-08  Mg     2.0     05-08                              9.1    6.04  )-----------( 142      ( 08 May 2022 07:49 )             29.7     PT/INR - ( 07 May 2022 07:34 )   PT: 12.8 sec;   INR: 1.11 ratio         PTT - ( 07 May 2022 07:34 )  PTT:24.7 sec

## 2022-05-09 NOTE — PRE-ANESTHESIA EVALUATION ADULT - NSANTHADDINFOFT_GEN_ALL_CORE
Discussed R/B/A of GETA vs. MAC w/ possible conversion to GA if stent required. Pt agreed to proceed w/ GETA.

## 2022-05-09 NOTE — PRE-ANESTHESIA EVALUATION ADULT - NSPROPOSEDPROCEDFT_GEN_ALL_CORE
Hospitalist History & Physical    Patient: Baldo Pulido Date: 6/15/2020  YOB: 1973 Admission Date: 6/15/2020  MRN: [id number] Attending: Cosme Baugh MD  Room: N28187/A Hospital Day: Hospital Day: 1  Hospitalist:      CHIEF COMPLAINT: Non-q wave myocardial ischemia (NSTEMI) +cocaine/thc  Troponin today  2.6. Creatinine  1.4.    HPI: TAP transfer from [place] ED. Accepted under Cards/desai. Severe Agitation/police custody for disordery conduct. Chest pain.On urine drug screen +cocaine/thc. NSTEMI. Pt recently left AMA from ICU at [place] few days ago with similar findings where he was DX for dehydration/rhabdo/elevated trop/ etoh intoxication and AMS. Echo from 6/4/20 with EF 55% .H&P from 1 week ago on prior admit, they note at that time police called EMS and he received ~350 IM ketamine for agitation. In ED received haldol and ativan. ICU MD had noted they evaluated patient in the ED and agreed that intubation was the only and  Safest way t imaging could be obtained and so that multiple security officers were not physically restraining him at that time. He has a hx of Antisocial personality disorder, PTSD, schizoaffective disorder,with homicidal ideations, auditory and visual hallucinations. Hx of chronic pain and history of CABG, mechanical valve replacement presenting with chest pain.  After his testing was completed he was weaned off the sedation and extubated. His Echocardiogram returned + for AR which was significant enough that a more comprehensive look at the heart to r/o possible endocarditis was recommended. The patient  refused stating that he understood the risks of this but he claims he already knew about his\" leaky heart\" and still left AMA.   Upon this evaluation in the ED today he was  more sedate after ativan . D/w ED and TAP team about his  Aggressive behavior discussed that he may need closer monitoring in ICU. Patient arrives under police custody with EMS. Patient was arrested for disorderly  conduct and began to complain of chest pain. He was brought in by police for evaluation. Patient is a poor historian. He is cursing at staff. He does admit to Left sided chest pain. Patient does have a history of cocaine. It is very possible that he used today.        Patient is here with complaints of chest pain, troponin 2.6, creatinine 1.4. started on heparin gtt and  aspirin    Pt had been started on asa/heparin gtt per cardiology . Patient's intermittent upper, left anterior chest pain started several hours ago. Denies chest pain at this moment.   Describes pain as neither pressure nor tightness nor burning nor aching without obvious aggrevating or relieving factors. .   The pain lasts 10-15 minutes at a time.    Associated with palpitaitons, dyspnea, diaphoresis, lightheadedness and axiety. Denies recent upper respiratory illness or any illness exposures.      PMH:  Past Medical History:   Diagnosis Date   • Chronic pain    • Essential (primary) hypertension    • Fracture    • Heart murmur     See his cardiologist every 2 months, may need replacement   • Heart murmur    • Lumbago        MEDICATIONS:  Scheduled Medications: [START ON 6/16/2020] aspirin, 81 mg, Daily  atorvastatin, 80 mg, Nightly      Continuous Infusions:   • heparin (porcine) 25,000 units/250 mL in dextrose 5 % infusion 9 Units/kg/hr (06/15/20 0956)   • nitroGLYcerin 50 mg in dextrose 5% 250 mL infusion       Home Medications:  No outpatient medications have been marked as taking for the 6/15/20 encounter (Hospital Encounter).       ALLERGIES:  ALLERGIES:  No Known Allergies    PSH:  No past surgical history on file.    FH:  Family History   Problem Relation Age of Onset   • Heart disease Mother    • Substance abuse Mother    • Depression Mother    • Schizophrenia Mother    • Diabetes Father    • Bipolar disorder Brother    • Schizophrenia Brother    • Substance abuse Brother        SH:  Social History     Tobacco Use   • Smoking status:  Current Every Day Smoker   • Smokeless tobacco: Never Used   Substance Use Topics   • Alcohol use: Yes     Alcohol/week: 5.0 standard drinks     Types: 5 Cans of beer per week     Frequency: 2-4 times a month     Drinks per session: 5 or 6     Binge frequency: Weekly   • Drug use: Yes     Frequency: 2.0 times per week     Types: Marijuana     Comment: UDS positive for cocaine, pt denies use       ROS:  Constitutional:  Denies fevers, chills, weakness, or fatigue, loss of appetite,  abnormal weight gain, or abnormal weight loss.   Respiratory:  Denies: cough, shortness of breath, hemoptysis and wheezing  Cardiovascular:  + chest pain, palpitations, +dyspnea on exertion, radiation down left arm. orthopnea, lower extremity edema   Gastrointestinal: Denies abdominal pain, heartburn, nausea, vomiting, diarrhea,constipation, or blood in stool.   Hematologic/Lymph:  Denies easy bruising or bleeding, swollen lymph glands.   Endocrine:  Denies heat or cold intolerance, polydipsia, or polyduria. Denies changes in hair or skin texture.   All other Review of Systems negative.    Vital Signs   Vital 24 Hour Range Last Value   Temperature Temp  Min: 98.7 °F (37.1 °C)  Max: 99.1 °F (37.3 °C) 98.7 °F (37.1 °C) (06/15/20 1122)   Pulse Pulse  Min: 67  Max: 77 68 (06/15/20 1122)   Respiratory Resp  Min: 12  Max: 35 18 (06/15/20 1122)   Non-Invasive  Blood Pressure BP  Min: 145/88  Max: 204/116 (!) 160/113 (06/15/20 1122)   Arterial  Blood Pressure No data recorded     Pulse Oximetry SpO2  Min: 94 %  Max: 98 % 95 % (06/15/20 1122)     Vital Admission Last Value   Weight Weight: 109.8 kg (06/15/20 0457) 109.8 kg (06/15/20 0457)   BMI N/A         PHYSICAL EXAM:  GENERAL:  The patient is in no apparent distress.   EYES:  IDA/EOMI  ENT:  There is no oral pallor or cyanosis.   RESPIRATORY: There is a normal respiratory effort with clear lungs to auscultation and percussion bilaterally.   CARDIOVASCULAR: RRR, normal S1 S2, +III/VI,  radiation of valve into carotid and humeral bone, no JVD, no carotid bruits large sternal scar with keloid   GI:  Non tender abdomen with normoactive bowel sounds and no hepatosplenomegaly. There are no masses palpable.   MUSCULOSKELETAL:  The patient has a normal gait, strength 5/5 in all 4 extremities   EXTREMITIES:  There is no clubbing or cyanosis of the digits or nails.  There is no bilateral lower extremity edema.   SKIN:  Warm, dry, and intact.   NEUROLOGIC:  The patient is alert and oriented to person, place, and time and has a normal mood and affect. No focal deficit.     LABS:  CARDIAC MARKERS:   Recent Labs   Lab 06/15/20  0923 06/15/20  0513   *  --    RAPDTR 2.67* 2.61*     BNP: No results found  CBC:   Recent Labs   Lab 06/15/20  0513   WBC 5.1   HGB 13.1   HCT 40.2        CMP:  Recent Labs   Lab 06/15/20  0518 06/15/20  0513   SODIUM  --  145   POTASSIUM  --  4.0   CHLORIDE  --  108*   CO2  --  22   BUN  --  15   CREATININE 1.40* 1.15   GLUCOSE  --  93       LIPID PANEL: No results found    STUDIES:  XR CHEST AP OR PA - PORTABLE   Final Result   IMPRESSION:   1. Hypoventilation with minimal bibasilar atelectasis. No other focal   consolidation is noted.             CXR: Hypoventilation with minimal bibasilar atelectasis. No other focal  consolidation is noted.    Echocardiogram: see Echo discussed in assessment and plan     EKG:EKG currently: Lateral t wave inversions and left anterior fascicular block unchanged from previous ekg.    Cardiac Catheterization:        ASSESSMENT AND PLAN:    Atyplical chest pain. Patent is intermediate risk for cardiac cause of chest pain given elevated troponin, Hypertension and active Cocaine use.  Pain likely due to NSTEMI. Low suspicion for pulmonary embolism or aortic dissection (atypical pain, no pulse defects, and no evidence of stroke). No evidence of pneumothorax with normal CXR and lung exams. Low suspicion for esophageal rupture (no recent  emesis, instrumentation, odynophagia and normal CXR).       1. Non STEMI -ACS  -Admit for monitoring and evaluation  Telemetry, Vs, cardiac diet  - Trend troponin to watch for peak   -EKG for Acute complaints of chest pain   - current trop 2.67  -Monitor and supplement electrolytes  -Consult cardiology anticipate LHC  -on heparin gtt already  -IVF 0.9 ns    -nitro gtt for CP    2. Hypertensive emergency  -may be multifactorial   1. May be related to aggression   2. May be related to pain  3. May be related to uncontrolled hypertension  4. May be related to coronary arterioslerosis  5. May be related to Cocaine, or polysubstance abuse   P.r.n. labetalol and clonidine at this time.  Also scheduled blood pressure medications.  Associated troponin elevation which is end-organ damage in the setting of elevated blood pressure.    3. Hyperlipidemia   LDL test 6/15 89, goal should be<70  Should be started on statin, will make sure   LFT's are WNL first      4. Polysubstance Abuse  Needs to abstain from drug   And ETOH use.   Especially Cocaine use  CIWA may be needed  Would benefit from inpatient rehab    5. CASTILLO 1.40, monitor   IVF0.9NS, may be from   Hypertensive crisis, decreased   Perfusion with NSTEMI or from Cocaine     6. AR- noted on echocardiogram  Additional imaging may need to be done  Peak gradient 14.5mmHg. MELVIN recommended      7. CAD, CABG previous   Unknown when, seen on imaging     8. Mild Mitral valve regurgitation    9. Moderately dilated ascending aorta 4.4 cm  -yearly monitoring     10 HFpEF LVEF 55%  Acute on chronic state. Uncompensated.   IVC Dilated, Global longitudinal strain, increased left ventricular wall thickness   minimal bibasilar atelectasis noted on CXR    11. Antisocial personality disorder  Schizoaffective disorder, Auditory and visual Hallucinations   Hx of homicidal ideations.  -pt has occasional auditory hallucinations, voices sometimes just sounds  -denies any visual hallucinations    -denies any current homicidal ideations   -pt had stopped his medication months ago. Restarted risperidone, monitor EKG for prolonged QTC, current 470  PRN ativan 0.5 mg, is no longer under police custody.      MARY JO Brandon    6/15/2020 12:27 PM    Patient seen and examined.  Was picked up by the police for agitation and ended up in the hospital.  Alcohol intoxication.  Patient was initially intubated the later extubated.  Now transferred to [facility] for elevated troponin.  Sounds like elevated troponin could be most likely secondary to the use of cocaine which seems like the most likely possibility.  Uncontrolled blood pressures.  Added labetalol to patient's regimen.  Demand ischemia as a likely possibility.  Will need some fall risk stratification.  There is also eccentric aortic regurgitation which is a concern.  Will discuss with Cardiology about the severity of the aortic regurgitation.  Patient tested positive for cocaine and cannabinoids.  Mild rhabdomyolysis which is really not that concerning at this time.  I do not see any evidence of acute kidney injury though he does have chronic kidney disease stage 3.  Baseline creatinine is 1.4.  Patient does not have stable angina but has non STEMI whether it is from demand ischemia or underlying ischemic burden in the coronary.  Will use p.r.n. labetalol or clonidine for blood pressure control.  Also psychiatry consult.  I am not too concerned about CPK level it is close to 840 and should be trending down.  This could happen secondary to patient's halfway or his time in incarceration.  Echocardiogram done today shows a EF of 42% with global hypokinesis which could be secondary to cocaine use.  Patient has a mechanical prosthetic aortic valve.  INR is 1.0.  Agree with the use of heparin drip.  Agree that he has polysubstance abuse issue.  Patient getting a MELVNI tomorrow to evaluate for possible aortic valve thrombus.  Patient is awake alert  oriented to time place and person, cardiovascular S1-S2 heard with regular rate and rhythm with a mechanical valve click.  Lungs are clear to auscultation bilaterally.  Agree with the above note except for the fact that the patient does not have stable angina.    Cosme Baugh MD  [phone]     Flex sig, possible stent

## 2022-05-09 NOTE — PRE-ANESTHESIA EVALUATION ADULT - NSANTHPMHFT_GEN_ALL_CORE
77 yo F w/ PMHx of osteoporosis, HLD, lumpectomy who presents with lower abdominal pain that started on Wednesday, admitted for partial LBO. Family hx of Colon Ca. Presents today for flexible sigmoidoscopy, possible colonic stent.    Denies CP/SOB/Orthopnea

## 2022-05-09 NOTE — PROGRESS NOTE ADULT - ASSESSMENT
76F with a PMHx of osteoporosis, HLD, lumpectomy who presents with lower abdominal pain that started on Wednesday. Pt states she has been passing gas but no BM since Wednesday. Otherwise denies fever/chills, CP, SOB or vomiting. Pt states she follows with Dr. Davila of GI and last had a colonoscopy 5 years ago. Also admits to a family hx of colon cancer. CT scan was done and showed Wall thickening in the distal sigmoid colon, possibly due to colitis or diverticulitis given the presence of sigmoid diverticula. However, wall thickening is somewhat irregular, raising suspicion for neoplasm. Suggest correlation with colonoscopy. Mild distention of the sigmoid and descending colon upstream to the site of sigmoid thickening, concerning for large bowel obstruction, likely partial. Moderate to large hiatal hernia. Admitted to surgery for monitoring of LBO.    PLAN:  - NPO, except meds   - IVF  - enemas, possible barium enemas if possible to arrange w/ radiology  - appreciate GI recs, poss flex sig +/- stent   - zosyn for colonic wall thickening   - eye drops   - DVT ppx     [facility]  [id number] 76F with a PMHx of osteoporosis, HLD, lumpectomy who presents with lower abdominal pain that started on Wednesday. Pt states she has been passing gas but no BM since Wednesday. Otherwise denies fever/chills, CP, SOB or vomiting. Pt states she follows with Dr. Davila of GI and last had a colonoscopy 5 years ago. Also admits to a family hx of colon cancer. CT scan was done and showed Wall thickening in the distal sigmoid colon, possibly due to colitis or diverticulitis given the presence of sigmoid diverticula. However, wall thickening is somewhat irregular, raising suspicion for neoplasm. Suggest correlation with colonoscopy. Mild distention of the sigmoid and descending colon upstream to the site of sigmoid thickening, concerning for large bowel obstruction, likely partial. Moderate to large hiatal hernia. Admitted to surgery for monitoring of LBO.    PLAN:  - NPO, except meds   - IVF  - appreciate GI recs, flex sig planned for today  - d/c zosyn   - eye drops   - DVT ppx     [facility]  [id number]

## 2022-05-09 NOTE — PRE-ANESTHESIA EVALUATION ADULT - NSANTHOSAYNRD_GEN_A_CORE
No. FRANCISCA screening performed.  STOP BANG Legend: 0-2 = LOW Risk; 3-4 = INTERMEDIATE Risk; 5-8 = HIGH Risk

## 2022-05-09 NOTE — PROGRESS NOTE ADULT - SUBJECTIVE AND OBJECTIVE BOX
Pt seen and examined in Endoscopy PACU upon recovery from Anesthesia.  Discussion (accompanied by Dr. Flores from GI) w/ pt in regards to intra-procedural event and post-operative complaints. Pt currently endorsing complaints of scratchy throat and tenderness over R-side of head. No other complaints.    VSS stable, oxygenating well w/o any respiratory difficulty.    PE: small bruise over Right side of forehead, otherwise unremarkable.    A/P: 77 yo F w/ PMHx of osteoporosis, HLD, lumpectomy who presents with lower abdominal pain that started on Wednesday, admitted for partial LBO s/p flexible sigmoidoscopy. Pt seen and examined in Endoscopy PACU upon recovery from Anesthesia.  Discussion (accompanied by Dr. Flores from GI) w/ pt in regards to intra-procedural event and post-operative complaints. Intra-operatively, during patient positioning, monitor fell from stand next to patient's head making contact w/ R side of patient's forehead. Pt currently endorsing complaints of scratchy throat and tenderness over R-side of head. No other complaints. Denies complaints of headache, blurred vision, confusion or photosensitivity.    VSS stable, oxygenating well w/o any respiratory difficulty.    PE:   Gen: NAD, AAOx3  HEENT: small bruise over Right side of forehead, otherwise unremarkable.   Neuro: non-focal    A/P: 77 yo F w/ PMHx of osteoporosis, HLD, lumpectomy who presents with lower abdominal pain that started on Wednesday, admitted for partial LBO s/p flexible sigmoidoscopy.  - Discussed in depth with patient and her daughter the event and what symptoms to self-monitor for. If any neurological signs/symptoms develop, would send for immediate head CT.  - Recommend to monitor for any symptoms of head injury.  - Recommend ice chips and clears for scratchy throat. If it continues to bother her, discussed salt-water gargling and cepacol lozenges.  - Will follow-up in AM.

## 2022-05-10 ENCOUNTER — TRANSCRIPTION ENCOUNTER (OUTPATIENT)
Age: 77
End: 2022-05-10

## 2022-05-10 VITALS — WEIGHT: 154.1 LBS

## 2022-05-10 LAB
ANION GAP SERPL CALC-SCNC: 12 MMOL/L — SIGNIFICANT CHANGE UP (ref 5–17)
BUN SERPL-MCNC: 6 MG/DL — LOW (ref 7–23)
CALCIUM SERPL-MCNC: 8.8 MG/DL — SIGNIFICANT CHANGE UP (ref 8.4–10.5)
CHLORIDE SERPL-SCNC: 107 MMOL/L — SIGNIFICANT CHANGE UP (ref 96–108)
CO2 SERPL-SCNC: 21 MMOL/L — LOW (ref 22–31)
CREAT SERPL-MCNC: 0.63 MG/DL — SIGNIFICANT CHANGE UP (ref 0.5–1.3)
EGFR: 91 ML/MIN/1.73M2 — SIGNIFICANT CHANGE UP
GLUCOSE SERPL-MCNC: 112 MG/DL — HIGH (ref 70–99)
HCT VFR BLD CALC: 31.1 % — LOW (ref 34.5–45)
HGB BLD-MCNC: 9.6 G/DL — LOW (ref 11.5–15.5)
MAGNESIUM SERPL-MCNC: 1.9 MG/DL — SIGNIFICANT CHANGE UP (ref 1.6–2.6)
MCHC RBC-ENTMCNC: 26.6 PG — LOW (ref 27–34)
MCHC RBC-ENTMCNC: 30.9 GM/DL — LOW (ref 32–36)
MCV RBC AUTO: 86.1 FL — SIGNIFICANT CHANGE UP (ref 80–100)
NRBC # BLD: 0 /100 WBCS — SIGNIFICANT CHANGE UP (ref 0–0)
PHOSPHATE SERPL-MCNC: 2.7 MG/DL — SIGNIFICANT CHANGE UP (ref 2.5–4.5)
PLATELET # BLD AUTO: 171 K/UL — SIGNIFICANT CHANGE UP (ref 150–400)
POTASSIUM SERPL-MCNC: 4.3 MMOL/L — SIGNIFICANT CHANGE UP (ref 3.5–5.3)
POTASSIUM SERPL-SCNC: 4.3 MMOL/L — SIGNIFICANT CHANGE UP (ref 3.5–5.3)
RBC # BLD: 3.61 M/UL — LOW (ref 3.8–5.2)
RBC # FLD: 14 % — SIGNIFICANT CHANGE UP (ref 10.3–14.5)
SODIUM SERPL-SCNC: 140 MMOL/L — SIGNIFICANT CHANGE UP (ref 135–145)
WBC # BLD: 4.29 K/UL — SIGNIFICANT CHANGE UP (ref 3.8–10.5)
WBC # FLD AUTO: 4.29 K/UL — SIGNIFICANT CHANGE UP (ref 3.8–10.5)

## 2022-05-10 PROCEDURE — 85610 PROTHROMBIN TIME: CPT

## 2022-05-10 PROCEDURE — 82330 ASSAY OF CALCIUM: CPT

## 2022-05-10 PROCEDURE — 82947 ASSAY GLUCOSE BLOOD QUANT: CPT

## 2022-05-10 PROCEDURE — U0003: CPT

## 2022-05-10 PROCEDURE — 96374 THER/PROPH/DIAG INJ IV PUSH: CPT

## 2022-05-10 PROCEDURE — U0005: CPT

## 2022-05-10 PROCEDURE — 36415 COLL VENOUS BLD VENIPUNCTURE: CPT

## 2022-05-10 PROCEDURE — 87086 URINE CULTURE/COLONY COUNT: CPT

## 2022-05-10 PROCEDURE — 84100 ASSAY OF PHOSPHORUS: CPT

## 2022-05-10 PROCEDURE — 99232 SBSQ HOSP IP/OBS MODERATE 35: CPT

## 2022-05-10 PROCEDURE — 70450 CT HEAD/BRAIN W/O DYE: CPT | Mod: 26

## 2022-05-10 PROCEDURE — 83690 ASSAY OF LIPASE: CPT

## 2022-05-10 PROCEDURE — 85730 THROMBOPLASTIN TIME PARTIAL: CPT

## 2022-05-10 PROCEDURE — 81001 URINALYSIS AUTO W/SCOPE: CPT

## 2022-05-10 PROCEDURE — 96375 TX/PRO/DX INJ NEW DRUG ADDON: CPT

## 2022-05-10 PROCEDURE — 85025 COMPLETE CBC W/AUTO DIFF WBC: CPT

## 2022-05-10 PROCEDURE — 99285 EMERGENCY DEPT VISIT HI MDM: CPT | Mod: 25

## 2022-05-10 PROCEDURE — 83735 ASSAY OF MAGNESIUM: CPT

## 2022-05-10 PROCEDURE — 85027 COMPLETE CBC AUTOMATED: CPT

## 2022-05-10 PROCEDURE — 83605 ASSAY OF LACTIC ACID: CPT

## 2022-05-10 PROCEDURE — 82435 ASSAY OF BLOOD CHLORIDE: CPT

## 2022-05-10 PROCEDURE — 80053 COMPREHEN METABOLIC PANEL: CPT

## 2022-05-10 PROCEDURE — 84132 ASSAY OF SERUM POTASSIUM: CPT

## 2022-05-10 PROCEDURE — 82803 BLOOD GASES ANY COMBINATION: CPT

## 2022-05-10 PROCEDURE — 70450 CT HEAD/BRAIN W/O DYE: CPT

## 2022-05-10 PROCEDURE — 74177 CT ABD & PELVIS W/CONTRAST: CPT | Mod: MA

## 2022-05-10 PROCEDURE — 85018 HEMOGLOBIN: CPT

## 2022-05-10 PROCEDURE — 84295 ASSAY OF SERUM SODIUM: CPT

## 2022-05-10 PROCEDURE — 85014 HEMATOCRIT: CPT

## 2022-05-10 PROCEDURE — 82565 ASSAY OF CREATININE: CPT

## 2022-05-10 PROCEDURE — 74019 RADEX ABDOMEN 2 VIEWS: CPT

## 2022-05-10 PROCEDURE — 80048 BASIC METABOLIC PNL TOTAL CA: CPT

## 2022-05-10 RX ORDER — POLYETHYLENE GLYCOL 3350 17 G/17G
17 POWDER, FOR SOLUTION ORAL
Qty: 0 | Refills: 0 | DISCHARGE
Start: 2022-05-10

## 2022-05-10 RX ORDER — SENNA PLUS 8.6 MG/1
2 TABLET ORAL
Qty: 0 | Refills: 0 | DISCHARGE
Start: 2022-05-10

## 2022-05-10 RX ORDER — POLYETHYLENE GLYCOL 3350 17 G/17G
17 POWDER, FOR SOLUTION ORAL
Refills: 0 | Status: DISCONTINUED | OUTPATIENT
Start: 2022-05-10 | End: 2022-05-10

## 2022-05-10 RX ADMIN — POLYETHYLENE GLYCOL 3350 17 GRAM(S): 17 POWDER, FOR SOLUTION ORAL at 11:02

## 2022-05-10 RX ADMIN — Medication 1 DROP(S): at 11:04

## 2022-05-10 RX ADMIN — Medication 62.5 MILLIMOLE(S): at 10:38

## 2022-05-10 RX ADMIN — ENOXAPARIN SODIUM 40 MILLIGRAM(S): 100 INJECTION SUBCUTANEOUS at 05:00

## 2022-05-10 RX ADMIN — Medication 1 DROP(S): at 05:00

## 2022-05-10 RX ADMIN — POLYETHYLENE GLYCOL 3350 17 GRAM(S): 17 POWDER, FOR SOLUTION ORAL at 05:31

## 2022-05-10 NOTE — DIETITIAN INITIAL EVALUATION ADULT - PERSON TAUGHT/METHOD
Provided low-fiber nutrition therapy including importance of avoiding  fiber rich foods, fresh fruits/vegetables and whole grains with nuts/seeds. Discussed adequate hydration and physical activity to help with constipation. Discussed gradual reintroduction of fiber back into diet once cleared by MD. Plan for outpatient follow-up with GI. Pt verbalized understanding and accepted written handout. Patient with no nutrition-related questions at this time. Made aware RD remains available as needed./verbal instruction/written material/patient instructed

## 2022-05-10 NOTE — DISCHARGE NOTE PROVIDER - NSDCFUSCHEDAPPT_GEN_ALL_CORE_FT
[facility] [address]  Scheduled Appointment: 07/06/2022    Braxton Roman  [facility] [address]  Scheduled Appointment: 07/06/2022

## 2022-05-10 NOTE — DIETITIAN INITIAL EVALUATION ADULT - PERTINENT LABORATORY DATA
05-10    140  |  107  |  6<L>  ----------------------------<  112<H>  4.3   |  21<L>  |  0.63    Ca    8.8      10 May 2022 06:57  Phos  2.7     05-10  Mg     1.9     05-10

## 2022-05-10 NOTE — CHART NOTE - NSCHARTNOTEFT_GEN_A_CORE
Pt had head CT which showed no acute head injury.  Results reviewed with patient.  She c/o headache, however does not want to see a neurologist while admitted and would like to be discharged home.  Pt given a copy of her head CT report and was advised to follow-up if she has any symptoms.    JEAN-CLAUDE Kline PA-C , pager # [phone] Pt had head CT which showed no acute head injury.  Results reviewed with patient.  She c/o headache, however does not want to see a neurologist while admitted and would like to be discharged home.  Pt given a copy of her head CT report and was advised to follow-up if she has any symptoms.  Pt states she has had a "bump" on her forehead for a long time and has had evaluations by dermatology and her PMD.  She was advised to follow-up after discharge.    JEAN-CLAUDE Kline PA-C , pager # [phone]

## 2022-05-10 NOTE — DIETITIAN INITIAL EVALUATION ADULT - REASON FOR ADMISSION
Chart reviewed, events noted. This is a " 76F with a PMHx of osteoporosis, HLD, lumpectomy who presented with lower abdominal pain that started 5 days ago. CT scan was done and showed Wall thickening in the distal sigmoid colon, possibly due to colitis or diverticulitis given the presence of sigmoid diverticula."

## 2022-05-10 NOTE — PROGRESS NOTE ADULT - ATTENDING COMMENTS
As above  Here with partial large bowel obstruction at the level of the sigmoid colon  Hypaque enema was not able to be performed this weekend  Had extensive discussion with patient and son and daughter this afternoon re: sigmoid obstruction  Advised her clinical exam remains fairly stable with mod abd distention with very small BMs x2 after enemas and ongoing flatus suggesting persistent partial obstruction  Advised the best way to determine the etiology of this obstruction is direct endoscopic visualization +/- biopsy; do not know what is causing obstruction and differential varies from benign disease such as inflammatory stricture to fibrotic stricture to stool ball to malignancy, etc  Also discussed that the decision to place a stent (which cannot be removed except via surgery) will be made intra-procedurally and in conjunction with colorectal surgery, and that following attempted stent placement (if successful or not), she will likely need definitive resection by CRC  Risks of migration, bleeding, perforation, and inability to place stent among other risks were discussed with the patient    Will tentatively plan for flex sig +/- stent placement tomorrow, time TBD pending endoscopy unit schedule  Keep NPO  Patient expressed understanding    Thank you for this interesting consult.  Please call the advanced GI service with any questions or concerns.
As above  pLBO likely secondary to fecolith, s/p flex sig, scope able to traverse area of mild inflammation, stool proximal  Needs aggressive bowel regimen, would like pt to have BM prior to discharge  Low fiber diet per surgery is fine for now  Re: forehead contusion, ice PRN, CT head negative  Pt to follow up as outpatient with GI for constipation management    Thank you for this interesting consult.  Please call the advanced GI service with any questions or concerns.
LBO of unclear etiology  -Small flatus and BM yesterday  -F/u GI regarding flex sig (today or tomorrow)  -dvt ppx  -F/u rad regarding hypaque enema

## 2022-05-10 NOTE — DISCHARGE NOTE NURSING/CASE MANAGEMENT/SOCIAL WORK - NSDCPEFALRISK_GEN_ALL_CORE
For information on Fall & Injury Prevention, visit: [email or web address] OR  [email or web address] OR  https://www.cdc.gov/steadi/patient.html

## 2022-05-10 NOTE — DIETITIAN INITIAL EVALUATION ADULT - OTHER INFO
weights: weight stable between ~ 154-162lbs per [facility]. Current dosing weight is 154lbs, will continue to monitor.

## 2022-05-10 NOTE — PROGRESS NOTE ADULT - SUBJECTIVE AND OBJECTIVE BOX
Surgery Progress Note     Subjective/24hour Events:   Patient seen and examined.       Vital Signs:  Vital Signs Last 24 Hrs  T(C): 36.5 (10 May 2022 01:54), Max: 36.9 (09 May 2022 13:00)  T(F): 97.7 (10 May 2022 01:54), Max: 98.5 (09 May 2022 13:00)  HR: 77 (10 May 2022 01:54) (69 - 89)  BP: 129/75 (10 May 2022 01:54) (122/66 - 159/80)  BP(mean): --  RR: 18 (10 May 2022 01:54) (16 - 24)  SpO2: 94% (10 May 2022 01:54) (94% - 100%)    CAPILLARY BLOOD GLUCOSE          I&O's Detail    08 May 2022 07:01  -  09 May 2022 07:00  --------------------------------------------------------  IN:    dextrose 5% + sodium chloride 0.45% w/ Additives: 900 mL    IV PiggyBack: 100 mL    Oral Fluid: 480 mL  Total IN: 1480 mL    OUT:    Voided (mL): 625 mL  Total OUT: 625 mL    Total NET: 855 mL      09 May 2022 07:01  -  10 May 2022 02:10  --------------------------------------------------------  IN:    dextrose 5% + sodium chloride 0.45% w/ Additives: 600 mL  Total IN: 600 mL    OUT:    Oral Fluid: 0 mL    Voided (mL): 800 mL  Total OUT: 800 mL    Total NET: -200 mL          MEDICATIONS  (STANDING):  artificial  tears Solution 1 Drop(s) Both EYES four times a day  atorvastatin 10 milliGRAM(s) Oral at bedtime  dextrose 5% + sodium chloride 0.45% with potassium chloride 20 mEq/L 1000 milliLiter(s) (75 mL/Hr) IV Continuous <Continuous>  enoxaparin Injectable 40 milliGRAM(s) SubCutaneous every 24 hours  ketotifen 0.025% Ophthalmic Solution 1 Drop(s) Both EYES every 8 hours  lactated ringers. 1000 milliLiter(s) (75 mL/Hr) IV Continuous <Continuous>  polyethylene glycol 3350 17 Gram(s) Oral at bedtime  psyllium Powder 1 Packet(s) Oral at bedtime  senna 2 Tablet(s) Oral at bedtime    MEDICATIONS  (PRN):  benzocaine 15 mG/menthol 3.6 mG Lozenge 1 Lozenge Oral every 1 hour PRN Sore Throat  ondansetron Injectable 4 milliGRAM(s) IV Push every 30 minutes PRN Nausea and/or Vomiting      Physical Exam:  General: NAD  Resp: nonlabored   Abdomen: soft, minimally tender, minimally distended  Vas: [facility]        Labs:    05-09    140  |  107  |  6<L>  ----------------------------<  104<H>  4.2   |  19<L>  |  0.69    Ca    8.9      09 May 2022 12:35  Phos  2.4     05-09  Mg     2.0     05-09                              9.6    4.51  )-----------( 165      ( 09 May 2022 12:35 )             31.4          Surgery Progress Note     Subjective/24hour Events:   Flex sig performed yesterday, mild segmental inflammation with ulcer seen.  +BM yesterday.  Tolerating clears, denies N/V.  Denies pain.  +flatus, no BM since yesterday.      Vital Signs:  Vital Signs Last 24 Hrs  T(C): 36.5 (10 May 2022 01:54), Max: 36.9 (09 May 2022 13:00)  T(F): 97.7 (10 May 2022 01:54), Max: 98.5 (09 May 2022 13:00)  HR: 77 (10 May 2022 01:54) (69 - 89)  BP: 129/75 (10 May 2022 01:54) (122/66 - 159/80)  BP(mean): --  RR: 18 (10 May 2022 01:54) (16 - 24)  SpO2: 94% (10 May 2022 01:54) (94% - 100%)    CAPILLARY BLOOD GLUCOSE          I&O's Detail    08 May 2022 07:01  -  09 May 2022 07:00  --------------------------------------------------------  IN:    dextrose 5% + sodium chloride 0.45% w/ Additives: 900 mL    IV PiggyBack: 100 mL    Oral Fluid: 480 mL  Total IN: 1480 mL    OUT:    Voided (mL): 625 mL  Total OUT: 625 mL    Total NET: 855 mL      09 May 2022 07:01  -  10 May 2022 02:10  --------------------------------------------------------  IN:    dextrose 5% + sodium chloride 0.45% w/ Additives: 600 mL  Total IN: 600 mL    OUT:    Oral Fluid: 0 mL    Voided (mL): 800 mL  Total OUT: 800 mL    Total NET: -200 mL          MEDICATIONS  (STANDING):  artificial  tears Solution 1 Drop(s) Both EYES four times a day  atorvastatin 10 milliGRAM(s) Oral at bedtime  dextrose 5% + sodium chloride 0.45% with potassium chloride 20 mEq/L 1000 milliLiter(s) (75 mL/Hr) IV Continuous <Continuous>  enoxaparin Injectable 40 milliGRAM(s) SubCutaneous every 24 hours  ketotifen 0.025% Ophthalmic Solution 1 Drop(s) Both EYES every 8 hours  lactated ringers. 1000 milliLiter(s) (75 mL/Hr) IV Continuous <Continuous>  polyethylene glycol 3350 17 Gram(s) Oral at bedtime  psyllium Powder 1 Packet(s) Oral at bedtime  senna 2 Tablet(s) Oral at bedtime    MEDICATIONS  (PRN):  benzocaine 15 mG/menthol 3.6 mG Lozenge 1 Lozenge Oral every 1 hour PRN Sore Throat  ondansetron Injectable 4 milliGRAM(s) IV Push every 30 minutes PRN Nausea and/or Vomiting      Physical Exam:  General: NAD  Resp: nonlabored   Abdomen: soft, minimally tender, minimally distended  Vas: [facility]        Labs:    05-09    140  |  107  |  6<L>  ----------------------------<  104<H>  4.2   |  19<L>  |  0.69    Ca    8.9      09 May 2022 12:35  Phos  2.4     05-09  Mg     2.0     05-09                              9.6    4.51  )-----------( 165      ( 09 May 2022 12:35 )             31.4

## 2022-05-10 NOTE — DISCHARGE NOTE PROVIDER - NSDCCPCAREPLAN_GEN_ALL_CORE_FT
PRINCIPAL DISCHARGE DIAGNOSIS  Diagnosis: Large bowel obstruction  Assessment and Plan of Treatment: 1.  Low fiber diet  2.  Activity as tolerated  3.  Follow-up with Dr. Casas and your PMD within 1-2 weeks.  Please call office for appointment.  Please call office or return to emergency room if you stop passing gas or having bowel movements, are unable to tolerate food or drink, have severe abdominal pain, persistent nausea or vomiting.

## 2022-05-10 NOTE — DIETITIAN INITIAL EVALUATION ADULT - PERTINENT MEDS FT
MEDICATIONS  (STANDING):  artificial  tears Solution 1 Drop(s) Both EYES four times a day  atorvastatin 10 milliGRAM(s) Oral at bedtime  enoxaparin Injectable 40 milliGRAM(s) SubCutaneous every 24 hours  ketotifen 0.025% Ophthalmic Solution 1 Drop(s) Both EYES every 8 hours  polyethylene glycol 3350 17 Gram(s) Oral <User Schedule>  psyllium Powder 1 Packet(s) Oral at bedtime  senna 2 Tablet(s) Oral at bedtime    MEDICATIONS  (PRN):  benzocaine 15 mG/menthol 3.6 mG Lozenge 1 Lozenge Oral every 1 hour PRN Sore Throat  ondansetron Injectable 4 milliGRAM(s) IV Push every 30 minutes PRN Nausea and/or Vomiting

## 2022-05-10 NOTE — DIETITIAN INITIAL EVALUATION ADULT - ORAL INTAKE PTA/DIET HISTORY
Pt reports good PO intake and appetite PTA, consumes regular well balance diet, Mediterranean style. NKFA. Pt denies chewing/swallowing difficulty, N/V does endorse chronic constipation.  Pt admitted with worsening abdominal pain x < 1 week, found to have LBO.

## 2022-05-10 NOTE — DIETITIAN INITIAL EVALUATION ADULT - ETIOLOGY
related to lack of exposure to previous diet education  related to inability to consume adequate nutrition in the setting of altered GI function (LBO)

## 2022-05-10 NOTE — DISCHARGE NOTE PROVIDER - CARE PROVIDER_API CALL
Eleno Casas)  ColonRectal Surgery; Surgery  [facility], [address] [place] [address]  Phone: [phone]  Fax: [phone]  Follow Up Time: 1 week

## 2022-05-10 NOTE — DISCHARGE NOTE PROVIDER - HOSPITAL COURSE
76F with a PMHx of osteoporosis, HLD, lumpectomy who presents with lower abdominal pain that started on Wednesday. Pt states she has been passing gas but no BM since Wednesday. Otherwise denies fever/chills, CP, SOB or vomiting. Pt states she follows with Dr. aDvila of GI and last had a colonoscopy 5 years ago. Also admits to a family hx of colon cancer. CT scan was done and showed Wall thickening in the distal sigmoid colon, possibly due to colitis or diverticulitis given the presence of sigmoid diverticula. However, wall thickening is somewhat irregular, raising suspicion for neoplasm. Suggest correlation with colonoscopy. Mild distention of the sigmoid and descending colon upstream to the site of sigmoid thickening, concerning for large bowel obstruction, likely partial. Moderate to large hiatal hernia. Admitted to surgery for monitoring of LBO.  She was admitted to the surgical service and was started on IV antibiotics.  GI was consulted and on 5/9 had flex sig that showed ulcer from constipation, otherwise unremarkable.  During procedure a monitor fell on her head, head CT was performed 5/10 which showed...............    She was given clears which she tolerated.  Diet was advanced to low fiber.  She was given Miralax and had a bowel movement.  She is ambulating, voiding, is tolerating a diet and is stable for discharge home. 76F with a PMHx of osteoporosis, HLD, lumpectomy who presents with lower abdominal pain that started on Wednesday. Pt states she has been passing gas but no BM since Wednesday. Otherwise denies fever/chills, CP, SOB or vomiting. Pt states she follows with Dr. Davila of GI and last had a colonoscopy 5 years ago. Also admits to a family hx of colon cancer. CT scan was done and showed Wall thickening in the distal sigmoid colon, possibly due to colitis or diverticulitis given the presence of sigmoid diverticula. However, wall thickening is somewhat irregular, raising suspicion for neoplasm. Suggest correlation with colonoscopy. Mild distention of the sigmoid and descending colon upstream to the site of sigmoid thickening, concerning for large bowel obstruction, likely partial. Moderate to large hiatal hernia. Admitted to surgery for monitoring of LBO.  She was admitted to the surgical service and was started on IV antibiotics.  GI was consulted and on 5/9 had flex sig that showed ulcer from constipation, otherwise unremarkable.  During procedure a monitor fell on her head, head CT was performed 5/10 which showed...............    She was given clears which she tolerated.  Diet was advanced to low fiber.  She was given Miralax and had a bowel movement.  She is ambulating, voiding, is tolerating a diet and is stable for discharge home and will follow-up with Dr. Casas within 1-2 weeks. 76F with a PMHx of osteoporosis, HLD, lumpectomy who presents with lower abdominal pain that started on Wednesday. Pt states she has been passing gas but no BM since Wednesday. Otherwise denies fever/chills, CP, SOB or vomiting. Pt states she follows with Dr. Davila of GI and last had a colonoscopy 5 years ago. Also admits to a family hx of colon cancer. CT scan was done and showed Wall thickening in the distal sigmoid colon, possibly due to colitis or diverticulitis given the presence of sigmoid diverticula. However, wall thickening is somewhat irregular, raising suspicion for neoplasm. Suggest correlation with colonoscopy. Mild distention of the sigmoid and descending colon upstream to the site of sigmoid thickening, concerning for large bowel obstruction, likely partial. Moderate to large hiatal hernia. Admitted to surgery for monitoring of LBO.  She was admitted to the surgical service and was started on IV antibiotics.  GI was consulted and on 5/9 had flex sig that showed ulcer from constipation, otherwise unremarkable.  Antibiotics were stopped. During procedure a monitor fell on her head, head CT was performed 5/10 which showed no acute injury.    She was given clears which she tolerated.  Diet was advanced to low fiber.  She was given Miralax and had a bowel movement.  She is ambulating, voiding, is tolerating a diet and is stable for discharge home and will follow-up with Dr. Casas within 1-2 weeks.

## 2022-05-10 NOTE — DISCHARGE NOTE NURSING/CASE MANAGEMENT/SOCIAL WORK - PATIENT PORTAL LINK FT
You can access the FollowMyHealth Patient Portal offered by [facility] by registering at the following website: [email or web address]. By joining [employer]’s FollowMyHealth portal, you will also be able to view your health information using other applications (apps) compatible with our system.

## 2022-05-10 NOTE — PROGRESS NOTE ADULT - ASSESSMENT
76F with a PMHx of osteoporosis, HLD, lumpectomy who presented with lower abdominal pain that started 5 days ago. CT scan was done and showed Wall thickening in the distal sigmoid colon, possibly due to colitis or diverticulitis given the presence of sigmoid diverticula. Admitted to surgery for monitoring of LBO.  Flexible sigmoidoscopy yesterday revealed no mass lesion    PLAN:  - Low residue diet  - Miralax BID  - Dietitian consult  - DVT ppx   - d/c planning, possibly home today  - We will arrange f/u visit with Dr. Davila for outpatient colonoscopy

## 2022-05-10 NOTE — PROGRESS NOTE ADULT - TIME BILLING
Jeremiah Dominique MD, FACP, FACG, AGAF  [facility]  [phone]     After hours and weekend coverage GI service : [phone]

## 2022-05-10 NOTE — PROGRESS NOTE ADULT - SUBJECTIVE AND OBJECTIVE BOX
Pt seen and evaluated at bedside, sitting comfortably. No acute events overnight. Pt with resolution of sore throat overnight. Pt denies headache, blurry vision, diplopia or confusion. Pt continued to express concern and anxiety in regards to her constipation and the severity of it. She expressed a lot of anxiety in regards to her dietary habits, and additionally expressing a desire to be discharged home. Of note, after initially denying any neurological/cognitive deficits or complaints, she went on to say that her "head just doesn't feel right" while pointing at her frontal sinuses. This complaint was very non-specific.    VSS, afebrile    PE:  Gen: NAD, AAOx3  HEENT: minimal bruising over R temple, PERRLA, following/tracking appropriately.  Neuro: Non-focal    Labs: reviewed    A/P: 75 yo F w/ PMHx of osteoporosis, HLD, lumpectomy, partial LBO s/p flexible sigmoidoscopy now w/ small head contusion after intra-operative event.  - No apparent neurological sequelae noted, however would recommend Head CT given patient's non-specific complaints regarding her head.  - Pending results of head CT, would consider formal Neurological evaluation if indicated.  - Further discussion with patient regarding intra-operative incident, however her main concerns were regarding to her diet and bowel issues.  - Will continue to follow. Pt seen and evaluated at bedside, sitting comfortably. No acute events overnight. Pt with resolution of sore throat overnight. Pt denies headache, blurry vision, diplopia or confusion. Pt continued to express concern and anxiety in regards to her constipation and the severity of it. She expressed a lot of anxiety in regards to her dietary habits, and additionally expressing a desire to be discharged home. Of note, after initially denying any neurological/cognitive deficits or complaints, she went on to say that her "head just doesn't feel right" while pointing at her frontal sinuses. This complaint was very non-specific.    VSS, afebrile    PE:  Gen: NAD, AAOx3  HEENT: minimal bruising over R temple, PERRLA, following/tracking appropriately.  Neuro: Non-focal    Labs: reviewed    A/P: 75 yo F w/ PMHx of osteoporosis, HLD, lumpectomy, partial LBO s/p flexible sigmoidoscopy now w/ small head contusion after intra-operative event.  - No apparent neurological sequelae noted, however would recommend Head CT given patient's non-specific complaints regarding her head.  - Pending results of head CT, would consider formal Neurological evaluation if indicated.  - Further discussion with patient regarding intra-operative incident, however her main concerns were regarding to her diet and bowel issues.  - Will continue to follow.  - Discussed findings and recommendations in depth w/ [facility].

## 2022-05-10 NOTE — DISCHARGE NOTE PROVIDER - NSDCMRMEDTOKEN_GEN_ALL_CORE_FT
atorvastatin 10 mg oral tablet: 1 tab(s) orally once a day  cycloSPORINE 0.05% ophthalmic emulsion: 1 drop(s) to each affected eye every 12 hours  Refresh Dry Eye Therapy ophthalmic solution: 1 drop(s) to each affected eye 4 times a day  Zaditor 0.025% ophthalmic solution: 1 drop(s) to each affected eye every 8 hours  ZyrTEC 10 mg oral tablet: 1 tab(s) orally once a day   atorvastatin 10 mg oral tablet: 1 tab(s) orally once a day  cycloSPORINE 0.05% ophthalmic emulsion: 1 drop(s) to each affected eye every 12 hours  polyethylene glycol 3350 oral powder for reconstitution: 17 gram(s) orally 3 times a day  Refresh Dry Eye Therapy ophthalmic solution: 1 drop(s) to each affected eye 4 times a day  senna oral tablet: 2 tab(s) orally once a day (at bedtime)  Zaditor 0.025% ophthalmic solution: 1 drop(s) to each affected eye every 8 hours  ZyrTEC 10 mg oral tablet: 1 tab(s) orally once a day

## 2022-05-10 NOTE — DIETITIAN INITIAL EVALUATION ADULT - ENERGY INTAKE
Fair (50-75%) In-house pt NPO x 3 days, advanced to clear liquid diet yesterday, followed by low fiber diet today. Pt reports good tolerance to solids, eating smaller light meals. Awaiting lunch during visit. No nausea reported. Pt receptive to diet education (see below for details). Encouraged adequate hydration.

## 2022-05-10 NOTE — PROGRESS NOTE ADULT - ASSESSMENT
76F with a PMHx of osteoporosis, HLD, lumpectomy who presents with lower abdominal pain that started on Wednesday. Pt states she has been passing gas but no BM since Wednesday. Otherwise denies fever/chills, CP, SOB or vomiting. Pt states she follows with Dr. Davila of GI and last had a colonoscopy 5 years ago. Also admits to a family hx of colon cancer. CT scan was done and showed Wall thickening in the distal sigmoid colon, possibly due to colitis or diverticulitis given the presence of sigmoid diverticula. However, wall thickening is somewhat irregular, raising suspicion for neoplasm. Suggest correlation with colonoscopy. Mild distention of the sigmoid and descending colon upstream to the site of sigmoid thickening, concerning for large bowel obstruction, likely partial. Moderate to large hiatal hernia. Admitted to surgery for monitoring of LBO.    PLAN:  - NPO, except meds   - IVF  - appreciate GI recs, flex sig planned for today  - d/c zosyn   - eye drops   - DVT ppx       [facility]  [id number] 76F with a PMHx of osteoporosis, HLD, lumpectomy who presents with lower abdominal pain that started on Wednesday. Pt states she has been passing gas but no BM since Wednesday. Otherwise denies fever/chills, CP, SOB or vomiting. Pt states she follows with Dr. Davila of GI and last had a colonoscopy 5 years ago. Also admits to a family hx of colon cancer. CT scan was done and showed Wall thickening in the distal sigmoid colon, possibly due to colitis or diverticulitis given the presence of sigmoid diverticula. However, wall thickening is somewhat irregular, raising suspicion for neoplasm. Suggest correlation with colonoscopy. Mild distention of the sigmoid and descending colon upstream to the site of sigmoid thickening, concerning for large bowel obstruction, likely partial. Moderate to large hiatal hernia. Admitted to surgery for monitoring of LBO.    PLAN:  - low fiber diet  - Miralax TID  - Dietitian consult  - DVT ppx   - d/c planning, possibly home today      [facility]  [id number]

## 2022-05-10 NOTE — PROGRESS NOTE ADULT - SUBJECTIVE AND OBJECTIVE BOX
Subjective/24hour Events:   Flex sig performed yesterday, mild segmental inflammation with ulcer seen.  No mass lesion noted.  +BM yesterday.  Tolerating clears, denies N/V.  Denies pain.      MEDICATIONS  (STANDING):  artificial  tears Solution 1 Drop(s) Both EYES four times a day  atorvastatin 10 milliGRAM(s) Oral at bedtime  dextrose 5% + sodium chloride 0.45% with potassium chloride 20 mEq/L 1000 milliLiter(s) (75 mL/Hr) IV Continuous <Continuous>  enoxaparin Injectable 40 milliGRAM(s) SubCutaneous every 24 hours  ketotifen 0.025% Ophthalmic Solution 1 Drop(s) Both EYES every 8 hours  lactated ringers. 1000 milliLiter(s) (75 mL/Hr) IV Continuous <Continuous>  polyethylene glycol 3350 17 Gram(s) Oral at bedtime  psyllium Powder 1 Packet(s) Oral at bedtime  senna 2 Tablet(s) Oral at bedtime    MEDICATIONS  (PRN):  benzocaine 15 mG/menthol 3.6 mG Lozenge 1 Lozenge Oral every 1 hour PRN Sore Throat  ondansetron Injectable 4 milliGRAM(s) IV Push every 30 minutes PRN Nausea and/or Vomiting      Vital Signs:  Vital Signs Last 24 Hrs  T(C): 36.5 (10 May 2022 01:54), Max: 36.9 (09 May 2022 13:00)  T(F): 97.7 (10 May 2022 01:54), Max: 98.5 (09 May 2022 13:00)  HR: 77 (10 May 2022 01:54) (69 - 89)  BP: 129/75 (10 May 2022 01:54) (122/66 - 159/80)  BP(mean): --  RR: 18 (10 May 2022 01:54) (16 - 24)  SpO2: 94% (10 May 2022 01:54) (94% - 100%)    Physical Exam:  General: NAD  Chest: clear  Cor: RRR   Abdomen: soft, non-tender, softly distended  Ext: No edema    Labs:                      9.6    4.29  )-----------( 171      ( 10 May 2022 06:59 )             31.1                 05-10    140  |  107  |  6<L>  ----------------------------<  112<H>  4.3   |  21<L>  |  0.63    Ca    8.8      10 May 2022 06:57  Phos  2.7     05-10  Mg     1.9     05-10    < from: Flexible Sigmoidoscopy (05.09.22 @ 17:22) >  [facility]  ____________________________________________________________________________________________________  Patient Name: Luci Suarez                      MRN: [id number]  Account Number: [id number]                     YOB: 1945  Room: Endoscopy Room 2                           Gender: Female  Attending MD: Tyree Flores MD                    Procedure Date No Time: 5/9/2022  ____________________________________________________________________________________________________     Procedure:           Flexible Sigmoidoscopy  Indications:         Partial colonic obstruction at level of sigmoid colon.  Providers:           Tyree Flores MD  Referring MD:        Lio Davila MD  Medicines: General Anesthesia  Complications:       No immediate complications from procedure.  ____________________________________________________________________________________________________  Procedure:           Pre-Anesthesia Assessment:         - Prior to the procedure, a History and Physical was performed, and patient                        medications and allergies were reviewed. The patient is competent. The risks                        and benefits of the procedure and the sedation options and risks were                        discussed with the patient. All questions were answered and informed consent                        was obtained. Patient identification and proposed procedure were verified by                        the physician, the nurse and the anesthesiologist in the pre-procedure area                        in the procedure room. Mental Status Examination: normal. Airway Examination:                        normal oropharyngeal airway and neck mobility. Respiratory Examination: clear                        to auscultation. CV Examination: regular rate and rhythm. ASA Grade                        Assessment: III - A patient with severe systemic disease. After reviewing the                        risks and benefits, the patient was deemed in satisfactory condition to                        undergo the procedure. The anesthesia plan was to use general anesthesia.                        Immediately prior to administration of medications, the patient was                       re-assessed for adequacy to receive sedatives. The heart rate, respiratory                        rate, oxygen saturations, blood pressure, adequacy of pulmonary ventilation,                        and response to care were monitored throughout the procedure. The physical                        status of the patient was re-assessed after the procedure.                       After obtaining informed consent, the endoscope was passed under direct                        vision. Throughout the procedure, the patient's blood pressure, pulse, and                        oxygen saturations were monitored continuously. The Endoscope was introduced                        through the anus and advanced to the sigmoid colon. The was introduced                        through the anus and advanced to the descending colon. The flexible                        sigmoidoscopy was accomplished without difficulty. The patient tolerated the                        procedure well. The quality of the bowel preparation was adequate.                                                                                                        Findings:       The perianal and digital rectal examinations were normal.       5 mm ulcer in a segment of edema was found in the sigmoid colon. No bleeding was present.       Multiple small-mouthed diverticula were found in the sigmoid colon.       A large amount of stool was found in the descending colon.                                                     Impression:          - Mild segmental inflammation with ulcer was found in the sigmoid colon, likely related to recent stool impaction in the area.                       - Diverticulosis in the sigmoid colon, without diverticulitis.                       - Moderate amount of stool in the descending colon proximal to the area of                        sigmoid colon inflammation.                       - No specimens collected.  Recommendation:      - Return patient to hospital floor.                       - Clear liquid diet today. If feeling well tomorrow, may advance diet to                        solid, low fiber diet.                       - Miralax 17 grams PO TID, give one dose tonight, titrate for adequate bowel                        movements.                       - Followup with primary gastroenterologist Dr. Lio Davila for outpatient colonoscopy and bowel regimen.                                                                                         Attending Participation:       I personally performed the entire procedure.                   ______________  Tyree Flores MD  5/9/2022 9:27:18 PM  Number of Addenda: 0    Note Initiated On: 5/9/2022 5:22 PM    < end of copied text >

## 2022-05-10 NOTE — PROGRESS NOTE ADULT - SUBJECTIVE AND OBJECTIVE BOX
Chief Complaint:  Patient is a 76y old  Female who presents with a chief complaint of LBO (10 May 2022 10:01)      Interval Events: Had BM last night and passing gas. No other GI related events this AM    Allergies:  Fosamax (Unknown)      Hospital Medications:  artificial  tears Solution 1 Drop(s) Both EYES four times a day  atorvastatin 10 milliGRAM(s) Oral at bedtime  benzocaine 15 mG/menthol 3.6 mG Lozenge 1 Lozenge Oral every 1 hour PRN  enoxaparin Injectable 40 milliGRAM(s) SubCutaneous every 24 hours  ketotifen 0.025% Ophthalmic Solution 1 Drop(s) Both EYES every 8 hours  ondansetron Injectable 4 milliGRAM(s) IV Push every 30 minutes PRN  polyethylene glycol 3350 17 Gram(s) Oral <User Schedule>  psyllium Powder 1 Packet(s) Oral at bedtime  senna 2 Tablet(s) Oral at bedtime      PMHX/PSHX:  Osteoporosis    Allergic Rhinitis    Ductal Carcinoma in Situ of Breast        Family history:      ROS: As per HPI, 14-point ROS negative otherwise.    General:  No wt loss, fevers, chills, night sweats, fatigue,   Eyes:  Good vision, no reported pain  ENT:  No sore throat, pain, runny nose, dysphagia  CV:  No pain, palpitations, hypo/hypertension  Resp:  No dyspnea, cough, tachypnea, wheezing  GI:  See HPI  :  No pain, bleeding, incontinence, nocturia  Muscle:  No pain, weakness  Neuro:  No weakness, tingling, memory problems  Psych:  No fatigue, insomnia, mood problems, depression  Endocrine:  No polyuria, polydipsia, cold/heat intolerance  Heme:  No petechiae, ecchymosis, easy bruisability  Skin:  No rash, edema      PHYSICAL EXAM:     Vital Signs:  Vital Signs Last 24 Hrs  T(C): 36.6 (10 May 2022 09:59), Max: 36.9 (09 May 2022 13:00)  T(F): 97.9 (10 May 2022 09:59), Max: 98.5 (09 May 2022 13:00)  HR: 90 (10 May 2022 09:59) (69 - 90)  BP: 113/63 (10 May 2022 09:59) (113/63 - 159/80)  BP(mean): --  RR: 18 (10 May 2022 09:59) (16 - 24)  SpO2: 98% (10 May 2022 09:59) (94% - 100%)  Daily Height in cm: 160.02 (09 May 2022 17:15)    Daily     GENERAL:  appears comfortable, no acute distress  HEENT:  NC/AT,  conjunctivae clear, sclera -anicteric  CHEST:  no increased effort  HEART:  Regular rate and rhythm  ABDOMEN:  Soft, non-tender, mildly distended  EXTREMITIES:  no cyanosis, clubbing or edema  SKIN:  No rash/erythema/ecchymoses/petechiae/wounds  NEURO:  Alert, oriented    LABS:                        9.6    4.29  )-----------( 171      ( 10 May 2022 06:59 )             31.1     05-10    140  |  107  |  6<L>  ----------------------------<  112<H>  4.3   |  21<L>  |  0.63    Ca    8.8      10 May 2022 06:57  Phos  2.7     05-10  Mg     1.9     05-10                Imaging:  < from: Flexible Sigmoidoscopy (05.09.22 @ 17:22) >  Impression:          - Mild segmental inflammation with ulcer was found in the sigmoid colon,                        likely related to recent stool impaction in the area.                       - Diverticulosis in the sigmoid colon, without diverticulitis.                       - Moderate amount of stool in the descending colon proximal to the area of                        sigmoid colon inflammation.                       - No specimens collected.  Recommendation:      - Return patient to hospital floor.                       - Clear liquid diet today. If feeling well tomorrow, may advance diet to                        solid, low fiber diet.                       - Miralax 17 grams PO TID, give one dose tonight, titrate for adequate bowel                        movements.                       - Followup with primary gastroenterologist Dr. Lio Davila for outpatient                        colonoscopy and bowel regimen.    < end of copied text >

## 2022-05-10 NOTE — PROGRESS NOTE ADULT - ASSESSMENT
76F with a PMHx of osteoporosis, HLD, lumpectomy who presented with lower abdominal pain that started 5 days ago. CT scan was done and showed Wall thickening in the distal sigmoid colon, possibly due to colitis or diverticulitis given the presence of sigmoid diverticula. Flexible sigmoidoscopy yesterday revealed no mass lesion    # LBO, partial: secondary to stool burden. S/p flex sig with stool noted but no lesions.     Recommendations:  - continue with miralax TID  - advance diet  - once ensured adequate BMs and tolerating diet, then can be followed as outpatient  - supportive care

## 2022-05-12 ENCOUNTER — NON-APPOINTMENT (OUTPATIENT)
Age: 77
End: 2022-05-12

## 2022-05-25 NOTE — ED PROVIDER NOTE - DATE/TIME 1
DRY EYE SYNDROME OU: RX ARTIFICIAL TEARS AS NEEDED TO INCREASE COMFORT OU. IF SYMPTOMS PERSIST CONSIDER PUNCTAL PLUGS. 29-Dec-2020 11:45

## 2022-07-06 ENCOUNTER — APPOINTMENT (OUTPATIENT)
Dept: ENDOCRINOLOGY | Facility: CLINIC | Age: 77
End: 2022-07-06

## 2022-07-06 VITALS
HEART RATE: 78 BPM | TEMPERATURE: 97.8 F | DIASTOLIC BLOOD PRESSURE: 70 MMHG | OXYGEN SATURATION: 97 % | WEIGHT: 154 LBS | BODY MASS INDEX: 27.63 KG/M2 | SYSTOLIC BLOOD PRESSURE: 124 MMHG | HEIGHT: 62.5 IN | RESPIRATION RATE: 16 BRPM

## 2022-07-06 PROCEDURE — 99213 OFFICE O/P EST LOW 20 MIN: CPT | Mod: 25

## 2022-07-06 PROCEDURE — 77080 DXA BONE DENSITY AXIAL: CPT

## 2022-07-06 PROCEDURE — 96372 THER/PROPH/DIAG INJ SC/IM: CPT

## 2022-07-06 PROCEDURE — ZZZZZ: CPT

## 2022-07-06 RX ORDER — DENOSUMAB 60 MG/ML
60 INJECTION SUBCUTANEOUS
Qty: 1 | Refills: 0 | Status: COMPLETED | OUTPATIENT
Start: 2022-07-06

## 2022-07-06 RX ADMIN — DENOSUMAB 60 MG/ML: 60 INJECTION SUBCUTANEOUS at 00:00

## 2022-07-06 NOTE — END OF VISIT
[FreeTextEntry3] : This note was written by Cookie Grimaldo on ( July 6, 2022) acting as a medical scribe for Dr. Roman.  This note was authored by the medical scribe for me. I have reviewed, edited, and revised the note as needed. I am in agreement with the exam findings, imaging findings, and treatment plan.  Braxton Roman MD

## 2022-07-06 NOTE — ASSESSMENT
[Denosumab Therapy] : Risks  and benefits of denosumab therapy were discussed with the patient including eczema, cellulitis, osteonecrosis of the jaw and atypical femur fractures [FreeTextEntry1] : 76 year-old female with osteoporosis. \par \par Pt was on Actonel until ~2013. She then started drug holiday. BMD essentially stable; 2014 sl decrease in proximal radius, 2016 increased in tot hip, 2017 sl decrease in tot hip and fem neck. BMD 2/2019 indicates continued decrease in the tot hip, fem neck, and proximal radius. Reviewed options of therapy. Pt had elected to restart Actonel 2/2019. Took correctly, tolerated well. BMD 2/2020 indicates improving osteopenia in spine and total hip, and improving osteoporosis in femoral neck and proximal radius. BMD 6/2021 indicates worsened osteopenia in spine and worsened low osteoporosis in femoral neck and proximal radius, and stable osteopenia in total hip. Options for medical therapy discussed in detail. Pt transitioned to Prolia 6/2021. Tolerating well. No thigh pain, no interval fx. Normal Ca. No ONJ. Bone density July 6, 2022 shows increase for stabilization  in bone density in all sites. Continue Prolia, buy and bill.\par \par Pt bowel is stable after hospitalization. Pt has a colonoscopy and endoscopy scheduled in the next few weeks. \par \par H/o Vitamin D insufficiency. C/w Vitamin D 2,000 units per day.\par \par Labs 10/2021 reviewed: Ca 9.3, normal. Vitamin D 34.1, normal. TSH 0.61, normal. Creatinine 0.84, normal.\par May 2022\par Calcium 9.8\par Creatinine 0.86\par \par F/u in 6 months

## 2022-07-06 NOTE — HISTORY OF PRESENT ILLNESS
[Risedronate (Actonel)] : Risedronate [Denosumab (Prolia)] : Denosumab [FreeTextEntry1] : Patient returns for a follow up visit osteoporosis. Pt had osteoporosis for several years.    Pt was previously on Actonel for treatment.  Transition to Prolia June 2021 due to decreasing bone density.  .  Tolerating well.  P Since the last visit pt has no fractures, or change in medications. . Pt is up to date with the dentist. No ONJ. \par t was hospitalized in May for a mild bowel obstruction.\par History \par Pt previously on Actonel followed by drug holiday. She was then back on rx, off Actonel  since ~2013. Took Actonel for many years, tolerated well. BMD 12/2016 stable, sl increase in hip. BMD 2017 sl decrease in hip. Last DDS 3 mons. Prior dental implants. No ONJ. No interval fx. BMD 2/2019 indicates continued decrease in the tot hip, fem neck, and proximal radius. Reviewed options of therapy. Pt restarted Actonel 2/2019. Took correctly, tolerated well. BMD 2/2020 indicates improving osteopenia in spine and total hip, and improving osteoporosis in femoral neck and proximal radius.\par  BMD 6/2021 indicates worsened osteopenia in spine and worsened low osteoporosis in femoral neck and proximal radius, and stable osteopenia in total hip. Pt transitioned to Prolia 6/2021. Tolerating well. No thigh pain, no interval fx. Normal Ca. Last DDS within past 6 months. No ONJ. Not planning major dental work.\par \par Had hematuria, dx with small stones saw urology Dr. Miranda.\par Had abn  routine mammogram. Bx: atypical ductal hyperplasia

## 2022-07-06 NOTE — PROCEDURE
[FreeTextEntry1] : Bone Density July 6, 2022\par Indication vs June 2021 Asses response to medication \par Spine-1.8 osteopenia  no significant change \par Total Hip-2.0 osteopenia  no significant change \par Femoral Neck -2.9 osteoporosis +7.3% \par Proximal Radius -3.1 osteoporosis  no significant change \par \par Bone mineral density: 06/07/2021 \par Indication: vs. 2020\par Spine: -2.2 osteopenia, -5.3%\par Total hip: -2.1 osteopenia, no significant change\par Femoral neck: -3.3 osteoporosis, -9.6%\par Proximal radius: -3.4 osteoporosis, -3.5%\par \par Bone mineral density: 02/14/2020 \par Indication: vs. 2019 assess response to medication\par Spine: -1.8 osteopenia, +3.4%\par Total hip: -2.1 osteopenia, +4.0%\par Femoral neck: -2.8 osteoporosis, +11.3%\par Proximal radius: -3.1 osteoporosis +9.9%\par \par Bone mineral density: 02/05/2019 \par Indication: vs. 2017 prior test showed bone loss \par Spine: -2.0 osteopenia, no significant change \par Total hip: -2.3 osteopenia (-4.3%)\par Femoral neck: -3.3 osteoporosis (-5.6%)\par Proximal radius: -3.8 osteoporosis (-6.8%)\par \par Bone mineral density December 13, 2017\par Indication prior test showed rapid bone loss\par Spine -2.0, osteopenia, no significant change\par Total hip -2.0, osteopenia, -4.9% versus 2016 although stable versus 2014\par Femoral neck -3.0, osteoporosis, -3.8%, not significant\par Proximal radius -3.3, osteoporosis, no significant change\par \par Bone mineral density December 13, 2016\par Two-year followup\par Spine -1.8, osteopenia, no significant change\par Total hip -1.7, osteopenia, +6.0%\par Femoral neck -2.8, osteoporosis, no significant change\par Troch -1.4, osteopenia, +4.3%\par Proximal radius -3.2, osteoporosis, no significant change\par \par bone mineral density Nov 26, 2014\par spine -1.8, osteopenia, stable vs 2012\par total hip, -2.1 osteopenia, stable\par femoral neck, -2.8, osteoporosis, stable\par proximal radius -3.1 ,osteoporosis,  -3.8 % vs 2012

## 2022-08-06 ENCOUNTER — NON-APPOINTMENT (OUTPATIENT)
Age: 77
End: 2022-08-06

## 2023-01-12 ENCOUNTER — APPOINTMENT (OUTPATIENT)
Dept: ENDOCRINOLOGY | Facility: CLINIC | Age: 78
End: 2023-01-12

## 2023-01-20 ENCOUNTER — APPOINTMENT (OUTPATIENT)
Dept: RADIOLOGY | Facility: CLINIC | Age: 78
End: 2023-01-20
Payer: MEDICARE

## 2023-01-20 PROCEDURE — 71046 X-RAY EXAM CHEST 2 VIEWS: CPT

## 2023-02-27 ENCOUNTER — APPOINTMENT (OUTPATIENT)
Dept: ENDOCRINOLOGY | Facility: CLINIC | Age: 78
End: 2023-02-27
Payer: MEDICARE

## 2023-02-27 PROCEDURE — 96372 THER/PROPH/DIAG INJ SC/IM: CPT

## 2023-02-27 RX ORDER — DENOSUMAB 60 MG/ML
60 INJECTION SUBCUTANEOUS
Qty: 1 | Refills: 0 | Status: COMPLETED | OUTPATIENT
Start: 2023-02-23

## 2023-07-27 ENCOUNTER — APPOINTMENT (OUTPATIENT)
Dept: ENDOCRINOLOGY | Facility: CLINIC | Age: 78
End: 2023-07-27
Payer: MEDICARE

## 2023-07-27 VITALS — OXYGEN SATURATION: 98 % | DIASTOLIC BLOOD PRESSURE: 70 MMHG | HEART RATE: 87 BPM | SYSTOLIC BLOOD PRESSURE: 152 MMHG

## 2023-07-27 VITALS — BODY MASS INDEX: 27.98 KG/M2 | HEIGHT: 62.3 IN | WEIGHT: 154 LBS

## 2023-07-27 PROCEDURE — 99213 OFFICE O/P EST LOW 20 MIN: CPT | Mod: 25

## 2023-07-27 PROCEDURE — ZZZZZ: CPT

## 2023-07-27 PROCEDURE — 77080 DXA BONE DENSITY AXIAL: CPT | Mod: GA

## 2023-07-28 NOTE — ASSESSMENT
[Denosumab Therapy] : Risks  and benefits of denosumab therapy were discussed with the patient including eczema, cellulitis, osteonecrosis of the jaw and atypical femur fractures [FreeTextEntry1] : 77 year-old female with osteoporosis. \par \par Pt was on Actonel until ~2013. She then started drug holiday. BMD essentially stable; 2014 sl decrease in proximal radius, 2016 increased in tot hip, 2017 sl decrease in tot hip and fem neck. BMD 2/2019 indicates continued decrease in the tot hip, fem neck, and proximal radius. Reviewed options of therapy. Pt had elected to restart Actonel 2/2019. Took correctly, tolerated well. BMD 2/2020 indicates improving osteopenia in spine and total hip, and improving osteoporosis in femoral neck and proximal radius. BMD 6/2021 indicates worsened osteopenia in spine and worsened low osteoporosis in femoral neck and proximal radius, and stable osteopenia in total hip. Options for medical therapy discussed in detail. \par Pt transitioned to Prolia 6/2021. Tolerating well. No thigh pain, no interval fx. Normal Ca. No ONJ. \par Bone density 2023 increased total hip. . \par H/o Vitamin D insufficiency. C/w Vitamin D 2,000 units per day.\par  \par \par Had tooth implant lower left tooth July 13; was recommended by dentist to hold Prolia for 3 months. \par Last Prolia dose February 27, 2023.  Can delay Prolia for 3 months.\par F/u in 3 months

## 2023-07-28 NOTE — PROCEDURE
[FreeTextEntry1] : Bone mineral density: 07/27/2023\par indication: Comparison to 2022\par spine -1.7 osteopenia no significant change\par total hip -2.0 osteopenia no significant change\par femoral neck -2.6 osteoporosis +6.2%\par proximal radius -3.3 osteoporosis no significant\par \par Bone Density July 6, 2022\par Indication vs June 2021 Asses response to medication \par Spine-1.8 osteopenia  no significant change \par Total Hip-2.0 osteopenia  no significant change \par Femoral Neck -2.9 osteoporosis +7.3% \par Proximal Radius -3.1 osteoporosis  no significant change \par \par Bone mineral density: 06/07/2021 \par Indication: vs. 2020\par Spine: -2.2 osteopenia, -5.3%\par Total hip: -2.1 osteopenia, no significant change\par Femoral neck: -3.3 osteoporosis, -9.6%\par Proximal radius: -3.4 osteoporosis, -3.5%\par \par Bone mineral density: 02/14/2020 \par Indication: vs. 2019 assess response to medication\par Spine: -1.8 osteopenia, +3.4%\par Total hip: -2.1 osteopenia, +4.0%\par Femoral neck: -2.8 osteoporosis, +11.3%\par Proximal radius: -3.1 osteoporosis +9.9%\par \par Bone mineral density: 02/05/2019 \par Indication: vs. 2017 prior test showed bone loss \par Spine: -2.0 osteopenia, no significant change \par Total hip: -2.3 osteopenia (-4.3%)\par Femoral neck: -3.3 osteoporosis (-5.6%)\par Proximal radius: -3.8 osteoporosis (-6.8%)\par \par Bone mineral density December 13, 2017\par Indication prior test showed rapid bone loss\par Spine -2.0, osteopenia, no significant change\par Total hip -2.0, osteopenia, -4.9% versus 2016 although stable versus 2014\par Femoral neck -3.0, osteoporosis, -3.8%, not significant\par Proximal radius -3.3, osteoporosis, no significant change\par \par Bone mineral density December 13, 2016\par Two-year followup\par Spine -1.8, osteopenia, no significant change\par Total hip -1.7, osteopenia, +6.0%\par Femoral neck -2.8, osteoporosis, no significant change\par Troch -1.4, osteopenia, +4.3%\par Proximal radius -3.2, osteoporosis, no significant change\par \par bone mineral density Nov 26, 2014\par spine -1.8, osteopenia, stable vs 2012\par total hip, -2.1 osteopenia, stable\par femoral neck, -2.8, osteoporosis, stable\par proximal radius -3.1 ,osteoporosis,  -3.8 % vs 2012

## 2023-09-07 NOTE — DIETITIAN INITIAL EVALUATION ADULT - WEIGHT (LBS)
154.1 Erivedge Pregnancy And Lactation Text: This medication is Pregnancy Category X and is absolutely contraindicated during pregnancy. It is unknown if it is excreted in breast milk.

## 2023-10-16 ENCOUNTER — APPOINTMENT (OUTPATIENT)
Dept: ENDOCRINOLOGY | Facility: CLINIC | Age: 78
End: 2023-10-16
Payer: MEDICARE

## 2023-10-16 LAB
25(OH)D3 SERPL-MCNC: 42.4 NG/ML
ALBUMIN SERPL ELPH-MCNC: 4.5 G/DL
ALP BLD-CCNC: 98 U/L
ALT SERPL-CCNC: 18 U/L
ANION GAP SERPL CALC-SCNC: 11 MMOL/L
AST SERPL-CCNC: 19 U/L
BILIRUB SERPL-MCNC: 0.4 MG/DL
BUN SERPL-MCNC: 12 MG/DL
CALCIUM SERPL-MCNC: 10.3 MG/DL
CHLORIDE SERPL-SCNC: 103 MMOL/L
CO2 SERPL-SCNC: 26 MMOL/L
CREAT SERPL-MCNC: 0.82 MG/DL
EGFR: 73 ML/MIN/1.73M2
GLUCOSE SERPL-MCNC: 95 MG/DL
POTASSIUM SERPL-SCNC: 5.1 MMOL/L
PROT SERPL-MCNC: 6.7 G/DL
SODIUM SERPL-SCNC: 139 MMOL/L

## 2023-10-16 PROCEDURE — 96372 THER/PROPH/DIAG INJ SC/IM: CPT

## 2023-10-16 RX ORDER — DENOSUMAB 60 MG/ML
60 INJECTION SUBCUTANEOUS
Qty: 1 | Refills: 0 | Status: COMPLETED | OUTPATIENT
Start: 2023-10-13

## 2023-11-15 ENCOUNTER — APPOINTMENT (OUTPATIENT)
Dept: INTERNAL MEDICINE | Facility: CLINIC | Age: 78
End: 2023-11-15

## 2024-01-06 PROBLEM — Z23 ENCOUNTER FOR IMMUNIZATION: Status: RESOLVED | Noted: 2020-09-14 | Resolved: 2024-01-06

## 2024-01-06 PROBLEM — M79.603 ARM PAIN: Status: RESOLVED | Noted: 2021-02-26 | Resolved: 2024-01-06

## 2024-01-06 PROBLEM — Q75.9 SKULL ANOMALY: Status: RESOLVED | Noted: 2017-02-06 | Resolved: 2024-01-06

## 2024-01-06 PROBLEM — Z01.419 WELL FEMALE EXAM WITH ROUTINE GYNECOLOGICAL EXAM: Status: RESOLVED | Noted: 2018-07-02 | Resolved: 2024-01-06

## 2024-01-10 ENCOUNTER — OUTPATIENT (OUTPATIENT)
Dept: OUTPATIENT SERVICES | Facility: HOSPITAL | Age: 79
LOS: 1 days | End: 2024-01-10
Payer: MEDICARE

## 2024-01-10 ENCOUNTER — APPOINTMENT (OUTPATIENT)
Dept: INTERNAL MEDICINE | Facility: CLINIC | Age: 79
End: 2024-01-10
Payer: MEDICARE

## 2024-01-10 VITALS
DIASTOLIC BLOOD PRESSURE: 72 MMHG | WEIGHT: 149 LBS | HEART RATE: 74 BPM | RESPIRATION RATE: 14 BRPM | SYSTOLIC BLOOD PRESSURE: 148 MMHG | BODY MASS INDEX: 26.99 KG/M2

## 2024-01-10 DIAGNOSIS — Q75.9 CONGENITAL MALFORMATION OF SKULL AND FACE BONES, UNSPECIFIED: ICD-10-CM

## 2024-01-10 DIAGNOSIS — M79.603 PAIN IN ARM, UNSPECIFIED: ICD-10-CM

## 2024-01-10 DIAGNOSIS — R07.89 OTHER CHEST PAIN: ICD-10-CM

## 2024-01-10 DIAGNOSIS — Z23 ENCOUNTER FOR IMMUNIZATION: ICD-10-CM

## 2024-01-10 DIAGNOSIS — Z12.4 ENCOUNTER FOR SCREENING FOR MALIGNANT NEOPLASM OF CERVIX: ICD-10-CM

## 2024-01-10 DIAGNOSIS — R39.9 UNSPECIFIED SYMPTOMS AND SIGNS INVOLVING THE GENITOURINARY SYSTEM: ICD-10-CM

## 2024-01-10 DIAGNOSIS — I10 ESSENTIAL (PRIMARY) HYPERTENSION: ICD-10-CM

## 2024-01-10 DIAGNOSIS — J30.9 ALLERGIC RHINITIS, UNSPECIFIED: ICD-10-CM

## 2024-01-10 DIAGNOSIS — Z00.00 ENCOUNTER FOR GENERAL ADULT MEDICAL EXAMINATION W/OUT ABNORMAL FINDINGS: ICD-10-CM

## 2024-01-10 DIAGNOSIS — I83.90 ASYMPTOMATIC VARICOSE VEINS OF UNSPECIFIED LOWER EXTREMITY: ICD-10-CM

## 2024-01-10 DIAGNOSIS — Z87.39 PERSONAL HISTORY OF OTHER DISEASES OF THE MUSCULOSKELETAL SYSTEM AND CONNECTIVE TISSUE: ICD-10-CM

## 2024-01-10 DIAGNOSIS — Z87.19 PERSONAL HISTORY OF OTHER DISEASES OF THE DIGESTIVE SYSTEM: ICD-10-CM

## 2024-01-10 DIAGNOSIS — Z87.898 PERSONAL HISTORY OF OTHER SPECIFIED CONDITIONS: ICD-10-CM

## 2024-01-10 DIAGNOSIS — Z87.828 PERSONAL HISTORY OF OTHER (HEALED) PHYSICAL INJURY AND TRAUMA: ICD-10-CM

## 2024-01-10 DIAGNOSIS — K59.09 OTHER CONSTIPATION: ICD-10-CM

## 2024-01-10 DIAGNOSIS — Z01.419 ENCOUNTER FOR GYNECOLOGICAL EXAMINATION (GENERAL) (ROUTINE) W/OUT ABNORMAL FINDINGS: ICD-10-CM

## 2024-01-10 LAB
ALBUMIN SERPL ELPH-MCNC: 4.4 G/DL
ALP BLD-CCNC: 78 U/L
ALT SERPL-CCNC: 17 U/L
ANION GAP SERPL CALC-SCNC: 10 MMOL/L
AST SERPL-CCNC: 16 U/L
BILIRUB SERPL-MCNC: 0.3 MG/DL
BUN SERPL-MCNC: 16 MG/DL
CALCIUM SERPL-MCNC: 9.4 MG/DL
CHLORIDE SERPL-SCNC: 104 MMOL/L
CHOLEST SERPL-MCNC: 167 MG/DL
CO2 SERPL-SCNC: 26 MMOL/L
CREAT SERPL-MCNC: 0.81 MG/DL
EGFR: 73 ML/MIN/1.73M2
GLUCOSE SERPL-MCNC: 104 MG/DL
HDLC SERPL-MCNC: 88 MG/DL
LDLC SERPL CALC-MCNC: 66 MG/DL
NONHDLC SERPL-MCNC: 79 MG/DL
POTASSIUM SERPL-SCNC: 4.6 MMOL/L
PROT SERPL-MCNC: 6.7 G/DL
SODIUM SERPL-SCNC: 139 MMOL/L
TRIGL SERPL-MCNC: 69 MG/DL

## 2024-01-10 PROCEDURE — G0439: CPT

## 2024-01-10 PROCEDURE — 99214 OFFICE O/P EST MOD 30 MIN: CPT

## 2024-01-10 PROCEDURE — G0438: CPT

## 2024-01-10 PROCEDURE — 99204 OFFICE O/P NEW MOD 45 MIN: CPT

## 2024-01-10 RX ORDER — LINACLOTIDE 72 UG/1
72 CAPSULE, GELATIN COATED ORAL
Qty: 90 | Refills: 3 | Status: ACTIVE | COMMUNITY
Start: 2024-01-10 | End: 1900-01-01

## 2024-01-10 RX ORDER — IPRATROPIUM BROMIDE 21 UG/1
0.03 SPRAY NASAL TWICE DAILY
Qty: 1 | Refills: 3 | Status: ACTIVE | COMMUNITY
Start: 2024-01-10 | End: 1900-01-01

## 2024-01-10 NOTE — HISTORY OF PRESENT ILLNESS
[FreeTextEntry1] : Ms. KELLEY is here for an annual physical examination and assessment. [de-identified] : She generally feels well with no specific complaints. Her recent health has been good. Going for lumpectomy next week. Long term issues with constipation.  Colonoscopy two years ago Would like to see vascular for varicose veins  Denies headache, dizziness. Denies rash, fatigue, fever, weight loss, anorexia. Denies cough, wheezing. Denies CP, SOB, ADAMS, edema, palpitations. Denies abdominal pain, N/V/D/C. Denies BRBPR, melena, dysphagia. Denies dysuria, frequency, hematuria, hesitancy. Denies weakness, numbness, gait instability.

## 2024-01-10 NOTE — HEALTH RISK ASSESSMENT
[Excellent] : ~his/her~  mood as  excellent [Monthly or less (1 pt)] : Monthly or less (1 point) [Never (0 pts)] : Never (0 points) [1 or 2 (0 pts)] : 1 or 2 (0 points) [No] : In the past 12 months have you used drugs other than those required for medical reasons? No [No falls in past year] : Patient reported no falls in the past year [0] : 2) Feeling down, depressed, or hopeless: Not at all (0) [PHQ-2 Negative - No further assessment needed] : PHQ-2 Negative - No further assessment needed [None] : None [Alone] : lives alone [] :  [Feels Safe at Home] : Feels safe at home [Fully functional (bathing, dressing, toileting, transferring, walking, feeding)] : Fully functional (bathing, dressing, toileting, transferring, walking, feeding) [Fully functional (using the telephone, shopping, preparing meals, housekeeping, doing laundry, using] : Fully functional and needs no help or supervision to perform IADLs (using the telephone, shopping, preparing meals, housekeeping, doing laundry, using transportation, managing medications and managing finances) [Reports normal functional visual acuity (ie: able to read med bottle)] : Reports normal functional visual acuity [Smoke Detector] : smoke detector [Seat Belt] :  uses seat belt [Former] : Former [Audit-CScore] : 1 [[id number]] : 0 [Change in mental status noted] : No change in mental status noted [Language] : denies difficulty with language [Behavior] : denies difficulty with behavior [Learning/Retaining New Information] : denies difficulty learning/retaining new information [Handling Complex Tasks] : denies difficulty handling complex tasks [Reasoning] : denies difficulty with reasoning [Spatial Ability and Orientation] : denies difficulty with spatial ability and orientation [Reports changes in hearing] : Reports no changes in hearing [Reports changes in vision] : Reports no changes in vision [Reports changes in dental health] : Reports no changes in dental health

## 2024-01-11 LAB
25(OH)D3 SERPL-MCNC: 39.1 NG/ML
BASOPHILS # BLD AUTO: 0.04 K/UL
BASOPHILS NFR BLD AUTO: 0.8 %
EOSINOPHIL # BLD AUTO: 0.13 K/UL
EOSINOPHIL NFR BLD AUTO: 2.5 %
ESTIMATED AVERAGE GLUCOSE: 105 MG/DL
HBA1C MFR BLD HPLC: 5.3 %
HCT VFR BLD CALC: 40 %
HGB BLD-MCNC: 12.6 G/DL
IMM GRANULOCYTES NFR BLD AUTO: 1.2 %
LYMPHOCYTES # BLD AUTO: 0.97 K/UL
LYMPHOCYTES NFR BLD AUTO: 18.8 %
MAN DIFF?: NORMAL
MCHC RBC-ENTMCNC: 29.5 PG
MCHC RBC-ENTMCNC: 31.5 GM/DL
MCV RBC AUTO: 93.7 FL
MONOCYTES # BLD AUTO: 0.5 K/UL
MONOCYTES NFR BLD AUTO: 9.7 %
NEUTROPHILS # BLD AUTO: 3.46 K/UL
NEUTROPHILS NFR BLD AUTO: 67 %
PLATELET # BLD AUTO: 144 K/UL
RBC # BLD: 4.27 M/UL
RBC # FLD: 14.6 %
WBC # FLD AUTO: 5.16 K/UL

## 2024-01-12 ENCOUNTER — TRANSCRIPTION ENCOUNTER (OUTPATIENT)
Age: 79
End: 2024-01-12

## 2024-01-16 ENCOUNTER — NON-APPOINTMENT (OUTPATIENT)
Age: 79
End: 2024-01-16

## 2024-01-19 RX ORDER — COLCHICINE 0.6 MG/1
0.6 TABLET ORAL
Qty: 10 | Refills: 1 | Status: ACTIVE | COMMUNITY
Start: 2024-01-19 | End: 1900-01-01

## 2024-01-23 DIAGNOSIS — I83.90 ASYMPTOMATIC VARICOSE VEINS OF UNSPECIFIED LOWER EXTREMITY: ICD-10-CM

## 2024-01-23 DIAGNOSIS — K59.09 OTHER CONSTIPATION: ICD-10-CM

## 2024-01-23 DIAGNOSIS — M10.9 GOUT, UNSPECIFIED: ICD-10-CM

## 2024-01-23 DIAGNOSIS — Z00.00 ENCOUNTER FOR GENERAL ADULT MEDICAL EXAMINATION WITHOUT ABNORMAL FINDINGS: ICD-10-CM

## 2024-01-23 DIAGNOSIS — J30.9 ALLERGIC RHINITIS, UNSPECIFIED: ICD-10-CM

## 2024-01-23 RX ORDER — INDOMETHACIN 25 MG/1
25 CAPSULE ORAL
Qty: 30 | Refills: 1 | Status: ACTIVE | COMMUNITY
Start: 2024-01-23 | End: 1900-01-01

## 2024-02-08 ENCOUNTER — RX RENEWAL (OUTPATIENT)
Age: 79
End: 2024-02-08

## 2024-02-08 RX ORDER — HYDROXYZINE HYDROCHLORIDE 10 MG/1
10 TABLET ORAL
Qty: 90 | Refills: 0 | Status: ACTIVE | COMMUNITY
Start: 2024-01-10 | End: 1900-01-01

## 2024-04-17 ENCOUNTER — APPOINTMENT (OUTPATIENT)
Dept: ENDOCRINOLOGY | Facility: CLINIC | Age: 79
End: 2024-04-17
Payer: MEDICARE

## 2024-04-17 DIAGNOSIS — M81.0 AGE-RELATED OSTEOPOROSIS W/OUT CURRENT PATHOLOGICAL FRACTURE: ICD-10-CM

## 2024-04-17 PROCEDURE — 96372 THER/PROPH/DIAG INJ SC/IM: CPT

## 2024-04-17 RX ORDER — DENOSUMAB 60 MG/ML
60 INJECTION SUBCUTANEOUS
Qty: 1 | Refills: 0 | Status: COMPLETED | OUTPATIENT
Start: 2024-04-16

## 2024-05-02 NOTE — SBIRT NOTE ADULT - NSSBIRTDRGNOACTINTDET_GEN_A_CORE
Report to Receiving RN:    Report To: Elizabeth Mayorga  Time Report Called: 1600  Hand-Off Communication: Verbal  Complications During Treatment: Yes Blood pressure dropped  Ultrafiltration Treatment: No  Medications Administered During Dialysis: No  Blood Products Administered During Dialysis: No  Labs Sent During Dialysis: No  Heparin Drip Rate Changes: No  Dialysis Catheter Dressing: No  Last Dressing Change: 5/1/2024  Informed the floor nurse per Dr Mitchell it was okay to end patient treatment to do a stat treatment.  Last b/p 121/56 Pulse 70    Electronic Signatures:  Danilo Wade   Authored:    Authored:     Last Updated: 5:47 PM by DANILO WADE         
N/A

## 2024-06-21 ENCOUNTER — APPOINTMENT (OUTPATIENT)
Dept: INTERNAL MEDICINE | Facility: CLINIC | Age: 79
End: 2024-06-21
Payer: MEDICARE

## 2024-06-21 ENCOUNTER — OUTPATIENT (OUTPATIENT)
Dept: OUTPATIENT SERVICES | Facility: HOSPITAL | Age: 79
LOS: 1 days | End: 2024-06-21
Payer: MEDICARE

## 2024-06-21 DIAGNOSIS — I10 ESSENTIAL (PRIMARY) HYPERTENSION: ICD-10-CM

## 2024-06-21 DIAGNOSIS — E78.5 HYPERLIPIDEMIA, UNSPECIFIED: ICD-10-CM

## 2024-06-21 DIAGNOSIS — M25.569 PAIN IN UNSPECIFIED KNEE: ICD-10-CM

## 2024-06-21 PROCEDURE — G0463: CPT

## 2024-06-21 PROCEDURE — 99213 OFFICE O/P EST LOW 20 MIN: CPT

## 2024-06-21 RX ORDER — CELECOXIB 100 MG/1
100 CAPSULE ORAL
Qty: 28 | Refills: 0 | Status: ACTIVE | COMMUNITY
Start: 2024-06-21 | End: 1900-01-01

## 2024-06-21 NOTE — HISTORY OF PRESENT ILLNESS
[Initial Evaluation, This Episode] : an initial evaluation of this episode of [Knee Pain] : knee pain [Knee Swelling] : no knee swelling [Knee Redness] : no knee redness [Knee Warmth] : no knee warmth [Knee Bruising] : no knee bruising [Knee Stiffness] : no knee stiffness [Knee Locking] : no knee locking [Knee Clicking] : no knee clicking [Worsening] : ~he/she~ reports the symptoms are worsening [Left Posterior Knee] : in the left posterior knee [Pain in Other Joints] : no pain in other joints

## 2024-06-21 NOTE — PHYSICAL EXAM
[Normal] : Normal [Effusion___(grade)] : no effusion [Popliteal Fossa] : popliteal fossa [Patellar Grind] : negative patellar grind [Apley's, Lat] : negative lateral Apley's Grind test [Apley's, Med] : negative medial Apley's Grind test [Anterior Drawer] : negative Anterior Drawer sign [Post Drawer] : negative Posterior Drawer sign [FreeTextEntry2] : Bakers cyst

## 2024-06-23 LAB
CHOLEST SERPL-MCNC: 218 MG/DL
HDLC SERPL-MCNC: 73 MG/DL
LDLC SERPL CALC-MCNC: 117 MG/DL
NONHDLC SERPL-MCNC: 146 MG/DL
TRIGL SERPL-MCNC: 168 MG/DL

## 2024-07-01 ENCOUNTER — APPOINTMENT (OUTPATIENT)
Dept: ORTHOPEDIC SURGERY | Facility: CLINIC | Age: 79
End: 2024-07-01
Payer: MEDICARE

## 2024-07-01 DIAGNOSIS — M17.12 UNILATERAL PRIMARY OSTEOARTHRITIS, LEFT KNEE: ICD-10-CM

## 2024-07-01 DIAGNOSIS — M25.569 PAIN IN UNSPECIFIED KNEE: ICD-10-CM

## 2024-07-01 DIAGNOSIS — E78.5 HYPERLIPIDEMIA, UNSPECIFIED: ICD-10-CM

## 2024-07-01 PROCEDURE — 99203 OFFICE O/P NEW LOW 30 MIN: CPT

## 2024-07-01 PROCEDURE — 73564 X-RAY EXAM KNEE 4 OR MORE: CPT | Mod: LT

## 2024-09-06 NOTE — ED PROVIDER NOTE - OBJECTIVE STATEMENT
Echo ordered  
Patient is a 76y F PMHx osteoporosis, constipation, family hx colon CA, presenting today with abdominal pain. Patient states her last BM was 2 days ago. Since yesterday she has tried home miralax, prunes, dulcolax, and 1 enema without success. Patient states she has been passing a small amount of gas. Is nauseous but denies vomiting. Denies urinary sxs, CP, SOB, fevers.

## 2024-11-18 ENCOUNTER — APPOINTMENT (OUTPATIENT)
Dept: ENDOCRINOLOGY | Facility: CLINIC | Age: 79
End: 2024-11-18
Payer: MEDICARE

## 2024-11-18 VITALS
BODY MASS INDEX: 27.26 KG/M2 | HEART RATE: 82 BPM | HEIGHT: 62.3 IN | WEIGHT: 150 LBS | DIASTOLIC BLOOD PRESSURE: 70 MMHG | OXYGEN SATURATION: 98 % | SYSTOLIC BLOOD PRESSURE: 122 MMHG

## 2024-11-18 VITALS — BODY MASS INDEX: 27.26 KG/M2 | WEIGHT: 150 LBS | HEIGHT: 62.3 IN

## 2024-11-18 DIAGNOSIS — Z85.3 PERSONAL HISTORY OF MALIGNANT NEOPLASM OF BREAST: ICD-10-CM

## 2024-11-18 DIAGNOSIS — Z79.811 LONG TERM (CURRENT) USE OF AROMATASE INHIBITORS: ICD-10-CM

## 2024-11-18 DIAGNOSIS — M81.0 AGE-RELATED OSTEOPOROSIS W/OUT CURRENT PATHOLOGICAL FRACTURE: ICD-10-CM

## 2024-11-18 PROCEDURE — 96372 THER/PROPH/DIAG INJ SC/IM: CPT

## 2024-11-18 PROCEDURE — 77080 DXA BONE DENSITY AXIAL: CPT | Mod: GA

## 2024-11-18 PROCEDURE — ZZZZZ: CPT

## 2024-11-18 PROCEDURE — 99214 OFFICE O/P EST MOD 30 MIN: CPT | Mod: 25

## 2024-11-18 RX ORDER — ANASTROZOLE TABLETS 1 MG/1
1 TABLET ORAL
Refills: 0 | Status: ACTIVE | COMMUNITY

## 2024-11-18 RX ORDER — DENOSUMAB 60 MG/ML
60 INJECTION SUBCUTANEOUS
Qty: 1 | Refills: 0 | Status: COMPLETED | OUTPATIENT
Start: 2024-11-18

## 2024-11-18 RX ADMIN — DENOSUMAB 60 MG/ML: 60 INJECTION SUBCUTANEOUS at 00:00

## 2025-02-11 NOTE — PACU DISCHARGE NOTE - THE ANESTHESIA ORDERS USED IN THE PACU ORDER SET WILL BE DISCONTINUED UPON TRANSFER OF THIS PATIENT
[facility]                               Cardiology      PCP: Tanya Garcia MD    Chief Complaint   Patient presents with    Follow-up     Jenny is here for a follow up appt.     Patient states she has had shortness of breath but is unsure if it was due to her having covid the past weeks.         HPI   Jenny Quinteros is a 76 year old White female here today.  The patient is here for follow-up today.  Overall she has been doing well however upon returning from [place] on February 1 she was feeling unwell and then February 2 tested positive for COVID.  She was prescribed Paxlovid which was completed on Friday, February 7.  She tested negative for COVID on Sunday, February 9.  She still feels somewhat short of breath which she attributes to recent COVID infection.  She denies chest pain, palpitations, dizziness, syncope.  No leg edema, orthopnea, or paroxysmal nocturnal dyspnea    PAST MEDICAL HX:    Osteoarthritis of both hips                     05/20/2019    Benign essential hypertension                   05/20/2019    Pure hypercholesterolemia                       05/20/2019    Impaired fasting glucose                        05/20/2019    Morbid obesity with BMI of 40.0-44.9, adult  (* 05/20/2019    Migraine without aura                           05/20/2019    Aortic valve sclerosis                          05/20/2019    Asthma, cough variant (CMD)                     05/20/2019    GERD (gastroesophageal reflux disease)          05/20/2019    Nephrolithiasis                                 05/20/2019    Carcinoma of vulva  (CMD)                       05/20/2019    Osteopenia of lumbar spine                      05/20/2019    Compression fracture of thoracic spine, non-tr* 05/20/2019    Chronic sinusitis                               05/20/2019    Allergic rhinitis                               05/20/2019    MGUS-IgG Kappa                                   2020    Chronic low back pain with right-sided sciatica 12/15/2020    Edema, peripheral                                             Chest pain                                                    PAST SURGICAL HX:    APPENDECTOMY                                                  TOTAL KNEE ARTHROPLASTY                                       TRIGGER FINGER RELEASE                                        BREAST BIOPSY                                                 TOTAL HIP ARTHROPLASTY                                        REMV 2ND CATARACT,CORN-SCLER SECTN                            CHOLECYSTECTOMY                                               VULVA SURGERY                                                   Comment: x 2    Family History   Problem Relation Age of Onset    Colon Polyps Mother     Arrhythmia Mother     Atrial Fibrilliation Mother     Pacemaker Mother     Hyperlipidemia Father     Hypertension Father     Heart disease Father     Arrhythmia Father     Pacemaker Father     Colon Polyps Brother     Diabetes Paternal Grandfather      Review of patient's family status indicates:    Mother                                       89    Father                                       89    Brother                                                  Paternal Grandfather                                       Social History     Socioeconomic History    Marital status:      Spouse name: Not on file    Number of children: 2    Years of education: Not on file    Highest education level: Not on file   Occupational History    Occupation: Homemaker   Tobacco Use    Smoking status: Former     Current packs/day: 0.00     Types: Cigarettes     Quit date: 1965     Years since quittin.7     Passive exposure: Never    Smokeless tobacco: Never   Vaping Use    Vaping status: never used   Substance and Sexual Activity    Alcohol use: Yes     Alcohol/week: 2.0 standard drinks of alcohol     Types: 2  Statement Selected Glasses of wine per week     Comment: socailly    Drug use: Never    Sexual activity: Not Currently     Partners: Male     Birth control/protection: None   Other Topics Concern    Not on file   Social History Narrative    Not on file     Social Determinants of Health     Financial Resource Strain: Low Risk  (4/6/2024)    Financial Resource Strain     Unable to Get: None   Food Insecurity: Low Risk  (4/6/2024)    Food Insecurity     Worried about Food: Never true     Food is Gone: Never true   Transportation Needs: Not At Risk (4/6/2024)    Transportation Needs     Lack of Reliable Transportation: No   Physical Activity: Inactive (12/15/2020)    Exercise Vital Sign     Days of Exercise per Week: 0 days     Minutes of Exercise per Session: 0 min   Stress: Not on file   Social Connections: Low Risk  (4/6/2024)    Social Connections     Social Connectivity: 5 or more times a week   Interpersonal Safety: Low Risk  (6/14/2024)    Interpersonal Safety     How often physically hurt: Never     How often insulted or talked down to: Never     How often threatened with harm: Never     How often scream or curse at: Never        Allergies  ALLERGIES:   Allergen Reactions    Covid-19 (Adenovirus) Vaccine Other (See Comments)     Other reaction(s): GI issues(diverticulitis)    Influenza Vaccines GI UPSET        Current Medications  Current Outpatient Medications   Medication Sig Dispense Refill    metoPROLOL succinate (TOPROL-XL) 25 MG 24 hr tablet Take 1 tablet by mouth daily. 90 tablet 1    metoPROLOL succinate (TOPROL-XL) 25 MG 24 hr tablet Take 1 tablet by mouth daily. 30 tablet 1    gabapentin (NEURONTIN) 300 MG capsule TAKE 1 CAPSULE BY MOUTH EVERY NIGHT 90 capsule 0    gabapentin (NEURONTIN) 100 MG capsule TAKE 1 CAPSULE BY MOUTH EVERY NIGHT AS NEEDED FOR NERVE PAIN 90 capsule 0    guaiFENesin-codeine (GUAIFENESIN AC) 100-10 MG/5ML liquid Take 5 mLs by mouth every 6 hours as needed for Cough. 120 mL 0    benzonatate  (TESSALON PERLES) 200 MG capsule Take 1 capsule by mouth 3 times daily as needed for Cough. 30 capsule 1    losartan (COZAAR) 100 MG tablet TAKE 1 TABLET BY MOUTH DAILY 90 tablet 2    simvastatin (ZOCOR) 40 MG tablet TAKE 1 TABLET BY MOUTH AT BEDTIME 90 tablet 2    baclofen (LIORESAL) 10 MG tablet TAKE 1 TABLET BY MOUTH IN THE MORNING AND IN THE EVENING 60 tablet 1    HYDROcodone-acetaminophen (NORCO)  MG per tablet 1 or 2 every 4 hours as needed for severe pain. PRN 60 tablet 0    zolpidem (AMBIEN) 10 MG tablet Take 1 tablet by mouth nightly as needed for Sleep. 90 tablet 0    gabapentin (NEURONTIN) 100 MG capsule Take 100 mg by mouth in the morning and 100 mg at noon and 100 mg in the evening.      Multiple Vitamins-Minerals (CENTRUM ADULT PO)       montelukast (SINGULAIR) 10 MG tablet TAKE 1 TABLET BY MOUTH DAILY 90 tablet 2    furosemide (LASIX) 40 MG tablet Take 0.5 tablets by mouth as needed (weight gain or leg edema). 30 tablet 1    Multiple Vitamins-Minerals (HAIR SKIN & NAILS PO) Take 1 tablet by mouth daily.      Aspirin 81 MG Cap Take 1 capsule by mouth daily. 90 capsule 3    magnesium 250 MG tablet       naproxen sodium (ALEVE) 220 MG tablet 1 po every morning      potassium CHLORIDE (KLOR-CON M) 20 MEQ luisito ER tablet Take 1 tablet by mouth daily (with breakfast). Do not start before April 7, 2024. (Patient taking differently: Take 20 mEq by mouth daily (with breakfast). PRN) 30 tablet 0    pantoprazole (PROTONIX) 40 MG tablet TAKE 1 TABLET BY MOUTH TWICE DAILY (Patient taking differently: Take 40 mg by mouth daily.) 180 tablet 2    Magnesium Hydroxide (DULCOLAX PO)       Levbid 0.375 MG 12 hr tablet Take 1 tablet by mouth in the morning and 1 tablet in the evening. Currently taking about once every other day 60 tablet 1    clobetasol (TEMOVATE) 0.05 % cream every 12 hours. PRN      Pseudoephedrine HCl (WAL-PHED D PO) Take 1 tablet by mouth nightly as needed (allergies).      Probiotic Product  (PROBIOTIC-10 PO) Take 1 tablet by mouth daily. daily       ZINC SULFATE PO Take 1 tablet by mouth at bedtime.       Cholecalciferol (VITAMIN D3) 5000 units capsule Take 5,000 Units by mouth daily.       vitamin B-12 (CYANOCOBALAMIN) 1000 MCG tablet Take 1,000 mcg by mouth daily.        No current facility-administered medications for this visit.        Review of Systems  Review of Systems   HENT:  Negative for nosebleeds.    Cardiovascular:  Negative for chest pain, claudication, dyspnea on exertion, irregular heartbeat, leg swelling, near-syncope, orthopnea, palpitations, paroxysmal nocturnal dyspnea and syncope.   Hematologic/Lymphatic: Negative for bleeding problem. Does not bruise/bleed easily.   Gastrointestinal:  Negative for hematochezia and melena.   Genitourinary:  Negative for hematuria.          Visit Vitals  /78 (BP Location: RUE - Right upper extremity, Patient Position: Sitting, Cuff Size: Large Adult)   Pulse 88   Ht 5' 5.5\" (1.664 m)   Wt 118.7 kg (261 lb 11 oz)   BMI 42.88 kg/m²     Vital signs were reviewed today.    Physical Exam  Constitutional:       Appearance: She is well-developed. She is not diaphoretic.   HENT:      Head: Normocephalic and atraumatic.   Eyes:      General: No scleral icterus.     Conjunctiva/sclera: Conjunctivae normal.      Pupils: Pupils are equal, round, and reactive to light.   Neck:      Vascular: Normal carotid pulses. No carotid bruit, hepatojugular reflux or JVD.   Cardiovascular:      Rate and Rhythm: Normal rate and regular rhythm.      Chest Wall: PMI is not displaced.      Pulses: Intact distal pulses.           Carotid pulses are 2+ on the right side and 2+ on the left side.       Dorsalis pedis pulses are 2+ on the right side and 2+ on the left side.      Heart sounds: Normal heart sounds, S1 normal and S2 normal. No murmur heard.     No gallop.   Pulmonary:      Effort: Pulmonary effort is normal. No respiratory distress.      Breath sounds: No  wheezing or rales.   Chest:      Chest wall: No tenderness.   Abdominal:      General: There is no distension.      Palpations: Abdomen is soft. There is no mass.      Tenderness: There is no abdominal tenderness. There is no guarding or rebound.   Musculoskeletal:         General: Normal range of motion.   Skin:     General: Skin is warm and dry.      Capillary Refill: Capillary refill takes less than 2 seconds.      Findings: No rash.   Neurological:      General: No focal deficit present.      Mental Status: She is alert and oriented to person, place, and time.      Cranial Nerves: No cranial nerve deficit.   Psychiatric:         Mood and Affect: Mood normal.         Behavior: Behavior normal. Behavior is cooperative.       Recent Labs:   Cholesterol/ HDL Ratio (no units)   Date Value   11/13/2024 2.8     HDL (mg/dL)   Date Value   11/13/2024 56     No components found for: \"LDL\"  Recent Labs   Lab 11/13/24  1157 06/14/24  1408 05/02/24  1054   WBC 4.4 7.0 5.8   RBC 4.26 4.28 4.14   HGB 12.2 12.0 11.6*   HCT 37.3 38.2 36.4   MCV 87.6 89.3 87.9   MCHC 32.7 31.4* 31.9*   RDW-CV 14.6 14.6 14.2   PLT 91* 106* 99*   Lymphocytes, Percent 24 19 15     Recent Labs   Lab 11/13/24  1157   GPT 18          Assessment :  Patient Active Problem List   Diagnosis    Trochanteric bursitis of both hips    Benign essential hypertension    Pure hypercholesterolemia    Impaired fasting glucose    Morbid obesity with BMI of 40.0-44.9, adult  (CMD)    Migraine without aura    Aortic valve sclerosis    Asthma, cough variant (CMD)    Leukopenia    Thrombocytopenia (CMD)    Encounter for long-term (current) use of high-risk medication    Diverticulosis    Nephrolithiasis    Asymptomatic microscopic hematuria    History of carcinoma in situ of vulva    Abnormal mammogram of left breast    Chronic sinusitis    Allergic rhinitis    Insomnia    Encounter for preventive care    Screening for colon cancer    Abnormal EKG    Chronic, continuous  use of opioids    Tinnitus of both ears    Vitamin D deficiency    Chronic low back pain    MGUS-IgG Kappa    Chronic left-sided low back pain with left-sided sciatica    Plantar fasciitis, bilateral    Gastroesophageal reflux disease without esophagitis    Abnormal CBC    Drug-induced constipation    Mouth sores    Leg edema, left    Hypertensive heart and chronic kidney disease with heart failure and stage 1 through stage 4 chronic kidney disease, or unspecified chronic kidney disease (CMD)    Peripheral neuropathy    Coronary artery disease involving native coronary artery of native heart without angina pectoris    Liver mass    COVID-19 virus infection    Acute cough        Plan:  Problem List Items Addressed This Visit          Cardiac and Vasculature    Benign essential hypertension     Hypertension is stable.  Continue current treatment regimen.  Blood pressure will be reassessed at the next regular appointment.         Relevant Medications    metoPROLOL succinate (TOPROL-XL) 25 MG 24 hr tablet    metoPROLOL succinate (TOPROL-XL) 25 MG 24 hr tablet    Pure hypercholesterolemia - Primary     Lipid abnormalities are  treated with high intensity moderate potency statins. .  Continue present management.  Lipids will be reassessed  by PCP .         Relevant Medications    metoPROLOL succinate (TOPROL-XL) 25 MG 24 hr tablet    metoPROLOL succinate (TOPROL-XL) 25 MG 24 hr tablet       Infectious Diseases    COVID-19 virus infection     Patient tested negative on February 9.  Shortness of breath may be still due to recent COVID infection.  Patient will follow-up with PCP with any further questions or issues.            Neuro    Peripheral neuropathy     Patient remains on gabapentin.

## 2025-03-04 ENCOUNTER — APPOINTMENT (OUTPATIENT)
Dept: INTERNAL MEDICINE | Facility: CLINIC | Age: 80
End: 2025-03-04
Payer: MEDICARE

## 2025-03-04 ENCOUNTER — OUTPATIENT (OUTPATIENT)
Dept: OUTPATIENT SERVICES | Facility: HOSPITAL | Age: 80
LOS: 1 days | End: 2025-03-04
Payer: MEDICARE

## 2025-03-04 VITALS
BODY MASS INDEX: 28.26 KG/M2 | HEART RATE: 74 BPM | RESPIRATION RATE: 14 BRPM | DIASTOLIC BLOOD PRESSURE: 70 MMHG | WEIGHT: 156 LBS | SYSTOLIC BLOOD PRESSURE: 138 MMHG

## 2025-03-04 DIAGNOSIS — I10 ESSENTIAL (PRIMARY) HYPERTENSION: ICD-10-CM

## 2025-03-04 DIAGNOSIS — E78.5 HYPERLIPIDEMIA, UNSPECIFIED: ICD-10-CM

## 2025-03-04 DIAGNOSIS — M89.8X8 OTHER SPECIFIED DISORDERS OF BONE, OTHER SITE: ICD-10-CM

## 2025-03-04 DIAGNOSIS — D17.9 BENIGN LIPOMATOUS NEOPLASM, UNSPECIFIED: ICD-10-CM

## 2025-03-04 DIAGNOSIS — Z00.00 ENCOUNTER FOR GENERAL ADULT MEDICAL EXAMINATION W/OUT ABNORMAL FINDINGS: ICD-10-CM

## 2025-03-04 PROCEDURE — G0439: CPT

## 2025-03-05 LAB
CHOLEST SERPL-MCNC: 169 MG/DL
HDLC SERPL-MCNC: 82 MG/DL
LDLC SERPL CALC-MCNC: 70 MG/DL
NONHDLC SERPL-MCNC: 87 MG/DL
TRIGL SERPL-MCNC: 89 MG/DL

## 2025-03-10 DIAGNOSIS — E78.5 HYPERLIPIDEMIA, UNSPECIFIED: ICD-10-CM

## 2025-03-10 DIAGNOSIS — Z00.00 ENCOUNTER FOR GENERAL ADULT MEDICAL EXAMINATION WITHOUT ABNORMAL FINDINGS: ICD-10-CM

## 2025-03-10 DIAGNOSIS — D17.9 BENIGN LIPOMATOUS NEOPLASM, UNSPECIFIED: ICD-10-CM

## 2025-03-12 ENCOUNTER — APPOINTMENT (OUTPATIENT)
Dept: ULTRASOUND IMAGING | Facility: CLINIC | Age: 80
End: 2025-03-12
Payer: MEDICARE

## 2025-03-12 ENCOUNTER — OUTPATIENT (OUTPATIENT)
Dept: OUTPATIENT SERVICES | Facility: HOSPITAL | Age: 80
LOS: 1 days | End: 2025-03-12
Payer: MEDICARE

## 2025-03-12 DIAGNOSIS — Z00.00 ENCOUNTER FOR GENERAL ADULT MEDICAL EXAMINATION WITHOUT ABNORMAL FINDINGS: ICD-10-CM

## 2025-03-12 PROCEDURE — 76775 US EXAM ABDO BACK WALL LIM: CPT | Mod: 26

## 2025-03-12 PROCEDURE — 76775 US EXAM ABDO BACK WALL LIM: CPT

## 2025-05-27 ENCOUNTER — NON-APPOINTMENT (OUTPATIENT)
Age: 80
End: 2025-05-27

## 2025-05-28 ENCOUNTER — APPOINTMENT (OUTPATIENT)
Dept: ENDOCRINOLOGY | Facility: CLINIC | Age: 80
End: 2025-05-28
Payer: MEDICARE

## 2025-05-28 DIAGNOSIS — M81.0 AGE-RELATED OSTEOPOROSIS W/OUT CURRENT PATHOLOGICAL FRACTURE: ICD-10-CM

## 2025-05-28 PROCEDURE — 96372 THER/PROPH/DIAG INJ SC/IM: CPT

## 2025-05-28 RX ORDER — DENOSUMAB 60 MG/ML
60 INJECTION SUBCUTANEOUS
Qty: 1 | Refills: 0 | Status: COMPLETED | OUTPATIENT
Start: 2025-05-28

## 2025-05-28 RX ADMIN — DENOSUMAB 60 MG/ML: 60 INJECTION SUBCUTANEOUS at 00:00

## 2025-07-10 NOTE — PRE-OP CHECKLIST - ANTIBIOTIC
Patient last seen 6-30-25    Gout Agents Refill  Protocol - 12 Month Protocol [id number]/10/2025 05:28 AM   Protocol Details Seen by prescribing provider or same department within the last 12 months or has a future appt in 3 months - IF FAILED PLEASE LOOK AT CHART REVIEW FOR LAST VISIT AND PROCEED ACCORDINGLY    eGFR greater than 29 within last 12 months looking at last value -- IF CRITERIA FAILED REFER TO PROTOCOL DETAILS    Medication (including dose and sig) on current meds list    AST resulted within last 12 months looking at last value    ALT resulted within last 12 months looking at last value      n/a

## (undated) DEVICE — PACK IV START WITH CHG

## (undated) DEVICE — SNARE POLYP SENS SM 13MM 240CM

## (undated) DEVICE — LITHOTRIPY BASKET TRAPEZOID 2.5CM

## (undated) DEVICE — BITE BLOCK ADULT 20 X 27MM (GREEN)

## (undated) DEVICE — NDL INJ SCLERO INTERJECT 23G

## (undated) DEVICE — SPHINCTEROTOME CLEVERCUT WIRE 25MM  2.8MM X 170CM

## (undated) DEVICE — TUBING IV SET GRAVITY 3Y 100" MACRO

## (undated) DEVICE — BIOPSY FORCEP RADIAL JAW 4 STANDARD WITH NEEDLE

## (undated) DEVICE — PAPIL BILRTH II 6-5FRX0.035IN

## (undated) DEVICE — POSITIONER FOAM HEAD CRADLE (PINK)

## (undated) DEVICE — SOL INJ NS 0.9% 500ML 2 PORT

## (undated) DEVICE — SNARE POLYP MINI ACCUSNR 1.5 X 3CM

## (undated) DEVICE — FOLEY HOLDER STATLOCK 2 WAY ADULT

## (undated) DEVICE — BRUSH COLONOSCOPY CYTOLOGY

## (undated) DEVICE — SUCTION YANKAUER NO CONTROL VENT

## (undated) DEVICE — ELCTR GROUNDING PAD ADULT COVIDIEN

## (undated) DEVICE — CATH IV SAFE BC 20G X 1.16" (PINK)

## (undated) DEVICE — NDL INJ SCLERO INTERJECT 25G

## (undated) DEVICE — BRUSH CYTO RAP EXCHG 3MM

## (undated) DEVICE — SENSOR O2 FINGER ADULT

## (undated) DEVICE — CATH IV SAFE BC 22G X 1" (BLUE)

## (undated) DEVICE — TUBING SUCTION 20FT